# Patient Record
Sex: FEMALE | Race: WHITE | NOT HISPANIC OR LATINO | Employment: FULL TIME | URBAN - METROPOLITAN AREA
[De-identification: names, ages, dates, MRNs, and addresses within clinical notes are randomized per-mention and may not be internally consistent; named-entity substitution may affect disease eponyms.]

---

## 2024-02-02 ENCOUNTER — APPOINTMENT (OUTPATIENT)
Dept: CARDIOLOGY | Facility: HOSPITAL | Age: 39
DRG: 394 | End: 2024-02-02
Payer: COMMERCIAL

## 2024-02-02 ENCOUNTER — HOSPITAL ENCOUNTER (INPATIENT)
Facility: HOSPITAL | Age: 39
LOS: 2 days | Discharge: HOME | DRG: 394 | End: 2024-02-04
Attending: EMERGENCY MEDICINE | Admitting: INTERNAL MEDICINE
Payer: COMMERCIAL

## 2024-02-02 ENCOUNTER — APPOINTMENT (OUTPATIENT)
Dept: RADIOLOGY | Facility: HOSPITAL | Age: 39
DRG: 394 | End: 2024-02-02
Payer: COMMERCIAL

## 2024-02-02 DIAGNOSIS — R11.2 NAUSEA AND VOMITING, UNSPECIFIED VOMITING TYPE: ICD-10-CM

## 2024-02-02 DIAGNOSIS — E83.42 HYPOMAGNESEMIA: Primary | ICD-10-CM

## 2024-02-02 DIAGNOSIS — K91.850 POUCHITIS (MULTI): ICD-10-CM

## 2024-02-02 DIAGNOSIS — E87.6 HYPOKALEMIA: ICD-10-CM

## 2024-02-02 LAB
ALBUMIN SERPL BCP-MCNC: 4.3 G/DL (ref 3.4–5)
ALP SERPL-CCNC: 74 U/L (ref 33–110)
ALT SERPL W P-5'-P-CCNC: 29 U/L (ref 7–45)
AMPHETAMINES UR QL SCN: NORMAL
ANION GAP SERPL CALC-SCNC: 19 MMOL/L (ref 10–20)
AST SERPL W P-5'-P-CCNC: 34 U/L (ref 9–39)
BARBITURATES UR QL SCN: NORMAL
BASOPHILS # BLD AUTO: 0.01 X10*3/UL (ref 0–0.1)
BASOPHILS NFR BLD AUTO: 0.1 %
BENZODIAZ UR QL SCN: NORMAL
BILIRUB DIRECT SERPL-MCNC: 0.7 MG/DL (ref 0–0.3)
BILIRUB SERPL-MCNC: 3.5 MG/DL (ref 0–1.2)
BUN SERPL-MCNC: 11 MG/DL (ref 6–23)
BZE UR QL SCN: NORMAL
C DIF TOX TCDA+TCDB STL QL NAA+PROBE: NOT DETECTED
CALCIUM SERPL-MCNC: 9 MG/DL (ref 8.6–10.3)
CANNABINOIDS UR QL SCN: NORMAL
CHLORIDE SERPL-SCNC: 86 MMOL/L (ref 98–107)
CO2 SERPL-SCNC: 30 MMOL/L (ref 21–32)
CREAT SERPL-MCNC: 0.48 MG/DL (ref 0.5–1.05)
CRP SERPL-MCNC: 2.44 MG/DL
EGFRCR SERPLBLD CKD-EPI 2021: >90 ML/MIN/1.73M*2
EOSINOPHIL # BLD AUTO: 0.01 X10*3/UL (ref 0–0.7)
EOSINOPHIL NFR BLD AUTO: 0.1 %
ERYTHROCYTE [DISTWIDTH] IN BLOOD BY AUTOMATED COUNT: 14.3 % (ref 11.5–14.5)
FENTANYL+NORFENTANYL UR QL SCN: NORMAL
GLUCOSE SERPL-MCNC: 155 MG/DL (ref 74–99)
HCT VFR BLD AUTO: 40.8 % (ref 36–46)
HGB BLD-MCNC: 14.8 G/DL (ref 12–16)
IMM GRANULOCYTES # BLD AUTO: 0.04 X10*3/UL (ref 0–0.7)
IMM GRANULOCYTES NFR BLD AUTO: 0.4 % (ref 0–0.9)
INR PPP: 1 (ref 0.9–1.1)
INR PPP: 1.1 (ref 0.9–1.1)
LACTATE SERPL-SCNC: 1.7 MMOL/L (ref 0.4–2)
LIPASE SERPL-CCNC: 69 U/L (ref 9–82)
LYMPHOCYTES # BLD AUTO: 0.45 X10*3/UL (ref 1.2–4.8)
LYMPHOCYTES NFR BLD AUTO: 4.6 %
MAGNESIUM SERPL-MCNC: 1.36 MG/DL (ref 1.6–2.4)
MCH RBC QN AUTO: 39.7 PG (ref 26–34)
MCHC RBC AUTO-ENTMCNC: 36.3 G/DL (ref 32–36)
MCV RBC AUTO: 109 FL (ref 80–100)
MONOCYTES # BLD AUTO: 0.41 X10*3/UL (ref 0.1–1)
MONOCYTES NFR BLD AUTO: 4.2 %
NEUTROPHILS # BLD AUTO: 8.95 X10*3/UL (ref 1.2–7.7)
NEUTROPHILS NFR BLD AUTO: 90.6 %
NRBC BLD-RTO: 0.2 /100 WBCS (ref 0–0)
OPIATES UR QL SCN: NORMAL
OXYCODONE+OXYMORPHONE UR QL SCN: NORMAL
PCP UR QL SCN: NORMAL
PLATELET # BLD AUTO: 97 X10*3/UL (ref 150–450)
POTASSIUM SERPL-SCNC: 2.5 MMOL/L (ref 3.5–5.3)
PROT SERPL-MCNC: 7.9 G/DL (ref 6.4–8.2)
PROTHROMBIN TIME: 11.6 SECONDS (ref 9.8–12.8)
PROTHROMBIN TIME: 12.4 SECONDS (ref 9.8–12.8)
RBC # BLD AUTO: 3.73 X10*6/UL (ref 4–5.2)
SODIUM SERPL-SCNC: 132 MMOL/L (ref 136–145)
WBC # BLD AUTO: 9.9 X10*3/UL (ref 4.4–11.3)

## 2024-02-02 PROCEDURE — 1210000001 HC SEMI-PRIVATE ROOM DAILY

## 2024-02-02 PROCEDURE — 85610 PROTHROMBIN TIME: CPT | Performed by: EMERGENCY MEDICINE

## 2024-02-02 PROCEDURE — 80307 DRUG TEST PRSMV CHEM ANLYZR: CPT | Performed by: INTERNAL MEDICINE

## 2024-02-02 PROCEDURE — 87506 IADNA-DNA/RNA PROBE TQ 6-11: CPT | Mod: PORLAB | Performed by: INTERNAL MEDICINE

## 2024-02-02 PROCEDURE — 82248 BILIRUBIN DIRECT: CPT | Performed by: INTERNAL MEDICINE

## 2024-02-02 PROCEDURE — 2500000004 HC RX 250 GENERAL PHARMACY W/ HCPCS (ALT 636 FOR OP/ED): Performed by: INTERNAL MEDICINE

## 2024-02-02 PROCEDURE — 85025 COMPLETE CBC W/AUTO DIFF WBC: CPT | Performed by: EMERGENCY MEDICINE

## 2024-02-02 PROCEDURE — 2500000004 HC RX 250 GENERAL PHARMACY W/ HCPCS (ALT 636 FOR OP/ED): Performed by: EMERGENCY MEDICINE

## 2024-02-02 PROCEDURE — 74177 CT ABD & PELVIS W/CONTRAST: CPT

## 2024-02-02 PROCEDURE — 36415 COLL VENOUS BLD VENIPUNCTURE: CPT | Performed by: INTERNAL MEDICINE

## 2024-02-02 PROCEDURE — 2550000001 HC RX 255 CONTRASTS: Performed by: INTERNAL MEDICINE

## 2024-02-02 PROCEDURE — 83993 ASSAY FOR CALPROTECTIN FECAL: CPT | Performed by: INTERNAL MEDICINE

## 2024-02-02 PROCEDURE — 96365 THER/PROPH/DIAG IV INF INIT: CPT

## 2024-02-02 PROCEDURE — 99222 1ST HOSP IP/OBS MODERATE 55: CPT | Performed by: INTERNAL MEDICINE

## 2024-02-02 PROCEDURE — 99285 EMERGENCY DEPT VISIT HI MDM: CPT | Mod: 25 | Performed by: EMERGENCY MEDICINE

## 2024-02-02 PROCEDURE — 99223 1ST HOSP IP/OBS HIGH 75: CPT | Performed by: INTERNAL MEDICINE

## 2024-02-02 PROCEDURE — 93005 ELECTROCARDIOGRAM TRACING: CPT

## 2024-02-02 PROCEDURE — 83605 ASSAY OF LACTIC ACID: CPT | Performed by: EMERGENCY MEDICINE

## 2024-02-02 PROCEDURE — 83735 ASSAY OF MAGNESIUM: CPT | Performed by: EMERGENCY MEDICINE

## 2024-02-02 PROCEDURE — 96361 HYDRATE IV INFUSION ADD-ON: CPT

## 2024-02-02 PROCEDURE — 96366 THER/PROPH/DIAG IV INF ADDON: CPT

## 2024-02-02 PROCEDURE — 80053 COMPREHEN METABOLIC PANEL: CPT | Performed by: EMERGENCY MEDICINE

## 2024-02-02 PROCEDURE — 85610 PROTHROMBIN TIME: CPT | Performed by: INTERNAL MEDICINE

## 2024-02-02 PROCEDURE — 96376 TX/PRO/DX INJ SAME DRUG ADON: CPT

## 2024-02-02 PROCEDURE — 36415 COLL VENOUS BLD VENIPUNCTURE: CPT | Performed by: EMERGENCY MEDICINE

## 2024-02-02 PROCEDURE — 74177 CT ABD & PELVIS W/CONTRAST: CPT | Performed by: RADIOLOGY

## 2024-02-02 PROCEDURE — 83690 ASSAY OF LIPASE: CPT | Performed by: EMERGENCY MEDICINE

## 2024-02-02 PROCEDURE — 87493 C DIFF AMPLIFIED PROBE: CPT | Performed by: INTERNAL MEDICINE

## 2024-02-02 PROCEDURE — 86140 C-REACTIVE PROTEIN: CPT | Performed by: INTERNAL MEDICINE

## 2024-02-02 PROCEDURE — 2500000001 HC RX 250 WO HCPCS SELF ADMINISTERED DRUGS (ALT 637 FOR MEDICARE OP): Performed by: INTERNAL MEDICINE

## 2024-02-02 PROCEDURE — 96375 TX/PRO/DX INJ NEW DRUG ADDON: CPT

## 2024-02-02 RX ORDER — LORAZEPAM 2 MG/ML
1 INJECTION INTRAMUSCULAR EVERY 2 HOUR PRN
Status: DISCONTINUED | OUTPATIENT
Start: 2024-02-02 | End: 2024-02-04 | Stop reason: HOSPADM

## 2024-02-02 RX ORDER — PROCHLORPERAZINE EDISYLATE 5 MG/ML
10 INJECTION INTRAMUSCULAR; INTRAVENOUS EVERY 6 HOURS PRN
Status: DISCONTINUED | OUTPATIENT
Start: 2024-02-02 | End: 2024-02-04 | Stop reason: HOSPADM

## 2024-02-02 RX ORDER — ATORVASTATIN CALCIUM 40 MG/1
40 TABLET, FILM COATED ORAL DAILY
COMMUNITY

## 2024-02-02 RX ORDER — AMLODIPINE BESYLATE 5 MG/1
1 TABLET ORAL DAILY
COMMUNITY

## 2024-02-02 RX ORDER — ACETAMINOPHEN 325 MG/1
650 TABLET ORAL EVERY 4 HOURS PRN
Status: DISCONTINUED | OUTPATIENT
Start: 2024-02-02 | End: 2024-02-04 | Stop reason: HOSPADM

## 2024-02-02 RX ORDER — PREDNISONE 10 MG/1
TABLET ORAL
COMMUNITY
End: 2024-02-04 | Stop reason: HOSPADM

## 2024-02-02 RX ORDER — POTASSIUM CHLORIDE 14.9 MG/ML
20 INJECTION INTRAVENOUS
Status: COMPLETED | OUTPATIENT
Start: 2024-02-02 | End: 2024-02-02

## 2024-02-02 RX ORDER — POTASSIUM CHLORIDE 750 MG/1
40 TABLET, FILM COATED, EXTENDED RELEASE ORAL ONCE
Status: COMPLETED | OUTPATIENT
Start: 2024-02-02 | End: 2024-02-02

## 2024-02-02 RX ORDER — DEXTROSE MONOHYDRATE, SODIUM CHLORIDE, AND POTASSIUM CHLORIDE 50; 1.49; 9 G/1000ML; G/1000ML; G/1000ML
100 INJECTION, SOLUTION INTRAVENOUS CONTINUOUS
Status: DISPENSED | OUTPATIENT
Start: 2024-02-02 | End: 2024-02-04

## 2024-02-02 RX ORDER — LORAZEPAM 2 MG/ML
2 INJECTION INTRAMUSCULAR EVERY 2 HOUR PRN
Status: DISCONTINUED | OUTPATIENT
Start: 2024-02-02 | End: 2024-02-04 | Stop reason: HOSPADM

## 2024-02-02 RX ORDER — FOLIC ACID 1 MG/1
1 TABLET ORAL DAILY
Status: DISCONTINUED | OUTPATIENT
Start: 2024-02-02 | End: 2024-02-04 | Stop reason: HOSPADM

## 2024-02-02 RX ORDER — ONDANSETRON HYDROCHLORIDE 2 MG/ML
4 INJECTION, SOLUTION INTRAVENOUS ONCE
Status: COMPLETED | OUTPATIENT
Start: 2024-02-02 | End: 2024-02-02

## 2024-02-02 RX ORDER — ENOXAPARIN SODIUM 100 MG/ML
40 INJECTION SUBCUTANEOUS EVERY 24 HOURS
Status: DISCONTINUED | OUTPATIENT
Start: 2024-02-02 | End: 2024-02-02 | Stop reason: SDUPTHER

## 2024-02-02 RX ORDER — LANOLIN ALCOHOL/MO/W.PET/CERES
100 CREAM (GRAM) TOPICAL DAILY
Status: DISCONTINUED | OUTPATIENT
Start: 2024-02-02 | End: 2024-02-04 | Stop reason: HOSPADM

## 2024-02-02 RX ORDER — MAGNESIUM SULFATE HEPTAHYDRATE 40 MG/ML
2 INJECTION, SOLUTION INTRAVENOUS ONCE
Status: COMPLETED | OUTPATIENT
Start: 2024-02-02 | End: 2024-02-02

## 2024-02-02 RX ORDER — LORAZEPAM 2 MG/ML
0.5 INJECTION INTRAMUSCULAR EVERY 2 HOUR PRN
Status: DISCONTINUED | OUTPATIENT
Start: 2024-02-02 | End: 2024-02-04 | Stop reason: HOSPADM

## 2024-02-02 RX ORDER — AMLODIPINE BESYLATE 5 MG/1
5 TABLET ORAL DAILY
Status: DISCONTINUED | OUTPATIENT
Start: 2024-02-02 | End: 2024-02-04 | Stop reason: HOSPADM

## 2024-02-02 RX ORDER — ONDANSETRON HYDROCHLORIDE 2 MG/ML
4 INJECTION, SOLUTION INTRAVENOUS EVERY 6 HOURS PRN
Status: DISCONTINUED | OUTPATIENT
Start: 2024-02-02 | End: 2024-02-04 | Stop reason: HOSPADM

## 2024-02-02 RX ORDER — MULTIVIT-MIN/IRON FUM/FOLIC AC 7.5 MG-4
1 TABLET ORAL DAILY
Status: DISCONTINUED | OUTPATIENT
Start: 2024-02-02 | End: 2024-02-04 | Stop reason: HOSPADM

## 2024-02-02 RX ORDER — ATORVASTATIN CALCIUM 40 MG/1
40 TABLET, FILM COATED ORAL DAILY
Status: DISCONTINUED | OUTPATIENT
Start: 2024-02-02 | End: 2024-02-04 | Stop reason: HOSPADM

## 2024-02-02 RX ORDER — ENOXAPARIN SODIUM 100 MG/ML
40 INJECTION SUBCUTANEOUS EVERY 24 HOURS
Status: DISCONTINUED | OUTPATIENT
Start: 2024-02-02 | End: 2024-02-04 | Stop reason: HOSPADM

## 2024-02-02 RX ORDER — PROCHLORPERAZINE 25 MG/1
25 SUPPOSITORY RECTAL EVERY 12 HOURS PRN
Status: DISCONTINUED | OUTPATIENT
Start: 2024-02-02 | End: 2024-02-04 | Stop reason: HOSPADM

## 2024-02-02 RX ADMIN — IOHEXOL 75 ML: 350 INJECTION, SOLUTION INTRAVENOUS at 21:54

## 2024-02-02 RX ADMIN — MAGNESIUM SULFATE HEPTAHYDRATE 2 G: 40 INJECTION, SOLUTION INTRAVENOUS at 16:56

## 2024-02-02 RX ADMIN — ONDANSETRON 4 MG: 2 INJECTION INTRAMUSCULAR; INTRAVENOUS at 09:36

## 2024-02-02 RX ADMIN — MAGNESIUM SULFATE HEPTAHYDRATE 2 G: 40 INJECTION, SOLUTION INTRAVENOUS at 11:12

## 2024-02-02 RX ADMIN — THIAMINE HCL TAB 100 MG 100 MG: 100 TAB at 21:29

## 2024-02-02 RX ADMIN — POTASSIUM CHLORIDE 40 MEQ: 750 TABLET, FILM COATED, EXTENDED RELEASE ORAL at 12:53

## 2024-02-02 RX ADMIN — ATORVASTATIN CALCIUM 40 MG: 40 TABLET, FILM COATED ORAL at 21:28

## 2024-02-02 RX ADMIN — SODIUM CHLORIDE 1000 ML: 9 INJECTION, SOLUTION INTRAVENOUS at 09:36

## 2024-02-02 RX ADMIN — POTASSIUM CHLORIDE 20 MEQ: 14.9 INJECTION, SOLUTION INTRAVENOUS at 11:13

## 2024-02-02 RX ADMIN — POTASSIUM CHLORIDE, DEXTROSE MONOHYDRATE AND SODIUM CHLORIDE 100 ML/HR: 150; 5; 900 INJECTION, SOLUTION INTRAVENOUS at 18:57

## 2024-02-02 RX ADMIN — POTASSIUM CHLORIDE 20 MEQ: 14.9 INJECTION, SOLUTION INTRAVENOUS at 12:56

## 2024-02-02 RX ADMIN — METHYLPREDNISOLONE SODIUM SUCCINATE 125 MG: 125 INJECTION, POWDER, FOR SOLUTION INTRAMUSCULAR; INTRAVENOUS at 12:53

## 2024-02-02 RX ADMIN — ACETAMINOPHEN 650 MG: 325 TABLET ORAL at 21:28

## 2024-02-02 RX ADMIN — ENOXAPARIN SODIUM 40 MG: 40 INJECTION SUBCUTANEOUS at 21:28

## 2024-02-02 SDOH — SOCIAL STABILITY: SOCIAL INSECURITY: WERE YOU ABLE TO COMPLETE ALL THE BEHAVIORAL HEALTH SCREENINGS?: YES

## 2024-02-02 SDOH — SOCIAL STABILITY: SOCIAL NETWORK: ARE YOU MARRIED, WIDOWED, DIVORCED, SEPARATED, NEVER MARRIED, OR LIVING WITH A PARTNER?: PATIENT DECLINED

## 2024-02-02 SDOH — HEALTH STABILITY: MENTAL HEALTH: HOW OFTEN DO YOU HAVE 6 OR MORE DRINKS ON ONE OCCASION?: LESS THAN MONTHLY

## 2024-02-02 SDOH — HEALTH STABILITY: MENTAL HEALTH: HOW MANY STANDARD DRINKS CONTAINING ALCOHOL DO YOU HAVE ON A TYPICAL DAY?: 1 OR 2

## 2024-02-02 SDOH — SOCIAL STABILITY: SOCIAL INSECURITY
WITHIN THE LAST YEAR, HAVE YOU BEEN HUMILIATED OR EMOTIONALLY ABUSED IN OTHER WAYS BY YOUR PARTNER OR EX-PARTNER?: PATIENT DECLINED

## 2024-02-02 SDOH — HEALTH STABILITY: MENTAL HEALTH: HOW OFTEN DO YOU HAVE A DRINK CONTAINING ALCOHOL?: 4 OR MORE TIMES A WEEK

## 2024-02-02 SDOH — SOCIAL STABILITY: SOCIAL INSECURITY: HAVE YOU HAD THOUGHTS OF HARMING ANYONE ELSE?: NO

## 2024-02-02 SDOH — SOCIAL STABILITY: SOCIAL NETWORK: HOW OFTEN DO YOU ATTENT MEETINGS OF THE CLUB OR ORGANIZATION YOU BELONG TO?: PATIENT DECLINED

## 2024-02-02 SDOH — SOCIAL STABILITY: SOCIAL INSECURITY: WITHIN THE LAST YEAR, HAVE YOU BEEN AFRAID OF YOUR PARTNER OR EX-PARTNER?: PATIENT DECLINED

## 2024-02-02 SDOH — SOCIAL STABILITY: SOCIAL INSECURITY
WITHIN THE LAST YEAR, HAVE YOU BEEN KICKED, HIT, SLAPPED, OR OTHERWISE PHYSICALLY HURT BY YOUR PARTNER OR EX-PARTNER?: PATIENT DECLINED

## 2024-02-02 SDOH — HEALTH STABILITY: PHYSICAL HEALTH: ON AVERAGE, HOW MANY MINUTES DO YOU ENGAGE IN EXERCISE AT THIS LEVEL?: PATIENT DECLINED

## 2024-02-02 SDOH — HEALTH STABILITY: MENTAL HEALTH
STRESS IS WHEN SOMEONE FEELS TENSE, NERVOUS, ANXIOUS, OR CAN'T SLEEP AT NIGHT BECAUSE THEIR MIND IS TROUBLED. HOW STRESSED ARE YOU?: PATIENT DECLINED

## 2024-02-02 SDOH — ECONOMIC STABILITY: INCOME INSECURITY
IN THE PAST 12 MONTHS, HAS THE ELECTRIC, GAS, OIL, OR WATER COMPANY THREATENED TO SHUT OFF SERVICE IN YOUR HOME?: PATIENT DECLINED

## 2024-02-02 SDOH — SOCIAL STABILITY: SOCIAL NETWORK: HOW OFTEN DO YOU ATTEND CHURCH OR RELIGIOUS SERVICES?: PATIENT DECLINED

## 2024-02-02 SDOH — ECONOMIC STABILITY: FOOD INSECURITY: WITHIN THE PAST 12 MONTHS, THE FOOD YOU BOUGHT JUST DIDN'T LAST AND YOU DIDN'T HAVE MONEY TO GET MORE.: PATIENT DECLINED

## 2024-02-02 SDOH — HEALTH STABILITY: PHYSICAL HEALTH
ON AVERAGE, HOW MANY DAYS PER WEEK DO YOU ENGAGE IN MODERATE TO STRENUOUS EXERCISE (LIKE A BRISK WALK)?: PATIENT DECLINED

## 2024-02-02 SDOH — ECONOMIC STABILITY: FOOD INSECURITY: WITHIN THE PAST 12 MONTHS, YOU WORRIED THAT YOUR FOOD WOULD RUN OUT BEFORE YOU GOT MONEY TO BUY MORE.: PATIENT DECLINED

## 2024-02-02 SDOH — SOCIAL STABILITY: SOCIAL NETWORK
DO YOU BELONG TO ANY CLUBS OR ORGANIZATIONS SUCH AS CHURCH GROUPS UNIONS, FRATERNAL OR ATHLETIC GROUPS, OR SCHOOL GROUPS?: PATIENT DECLINED

## 2024-02-02 SDOH — SOCIAL STABILITY: SOCIAL NETWORK: IN A TYPICAL WEEK, HOW MANY TIMES DO YOU TALK ON THE PHONE WITH FAMILY, FRIENDS, OR NEIGHBORS?: PATIENT DECLINED

## 2024-02-02 SDOH — SOCIAL STABILITY: SOCIAL NETWORK: HOW OFTEN DO YOU GET TOGETHER WITH FRIENDS OR RELATIVES?: PATIENT DECLINED

## 2024-02-02 SDOH — SOCIAL STABILITY: SOCIAL INSECURITY
WITHIN THE LAST YEAR, HAVE TO BEEN RAPED OR FORCED TO HAVE ANY KIND OF SEXUAL ACTIVITY BY YOUR PARTNER OR EX-PARTNER?: PATIENT DECLINED

## 2024-02-02 ASSESSMENT — COLUMBIA-SUICIDE SEVERITY RATING SCALE - C-SSRS
1. IN THE PAST MONTH, HAVE YOU WISHED YOU WERE DEAD OR WISHED YOU COULD GO TO SLEEP AND NOT WAKE UP?: NO
2. HAVE YOU ACTUALLY HAD ANY THOUGHTS OF KILLING YOURSELF?: NO
6. HAVE YOU EVER DONE ANYTHING, STARTED TO DO ANYTHING, OR PREPARED TO DO ANYTHING TO END YOUR LIFE?: NO

## 2024-02-02 ASSESSMENT — ACTIVITIES OF DAILY LIVING (ADL)
ADEQUATE_TO_COMPLETE_ADL: YES
FEEDING YOURSELF: INDEPENDENT
BATHING: INDEPENDENT
GROOMING: INDEPENDENT
TOILETING: INDEPENDENT
HEARING - LEFT EAR: FUNCTIONAL
DRESSING YOURSELF: INDEPENDENT
LACK_OF_TRANSPORTATION: NO
PATIENT'S MEMORY ADEQUATE TO SAFELY COMPLETE DAILY ACTIVITIES?: YES
LACK_OF_TRANSPORTATION: NO
JUDGMENT_ADEQUATE_SAFELY_COMPLETE_DAILY_ACTIVITIES: YES
WALKS IN HOME: INDEPENDENT
HEARING - RIGHT EAR: FUNCTIONAL

## 2024-02-02 ASSESSMENT — LIFESTYLE VARIABLES
ANXIETY: NO ANXIETY, AT EASE
VISUAL DISTURBANCES: NOT PRESENT
TREMOR: NO TREMOR
PRESCIPTION_ABUSE_PAST_12_MONTHS: NO
NAUSEA AND VOMITING: NO NAUSEA AND NO VOMITING
EVER FELT BAD OR GUILTY ABOUT YOUR DRINKING: NO
PAROXYSMAL SWEATS: NO SWEAT VISIBLE
HEADACHE, FULLNESS IN HEAD: NOT PRESENT
AUDITORY DISTURBANCES: NOT PRESENT
HOW OFTEN DURING THE LAST YEAR HAVE YOU FAILED TO DO WHAT WAS NORMALLY EXPECTED FROM YOU BECAUSE OF DRINKING: NEVER
HOW OFTEN DURING THE LAST YEAR HAVE YOU NEEDED AN ALCOHOLIC DRINK FIRST THING IN THE MORNING TO GET YOURSELF GOING AFTER A NIGHT OF HEAVY DRINKING: NEVER
ORIENTATION AND CLOUDING OF SENSORIUM: ORIENTED AND CAN DO SERIAL ADDITIONS
ANXIETY: NO ANXIETY, AT EASE
VISUAL DISTURBANCES: NOT PRESENT
HOW OFTEN DURING THE LAST YEAR HAVE YOU FOUND THAT YOU WERE NOT ABLE TO STOP DRINKING ONCE YOU HAD STARTED: NEVER
AUDIT-C TOTAL SCORE: 4
AUDIT TOTAL SCORE: 4
ORIENTATION AND CLOUDING OF SENSORIUM: ORIENTED AND CAN DO SERIAL ADDITIONS
AGITATION: NORMAL ACTIVITY
ORIENTATION AND CLOUDING OF SENSORIUM: ORIENTED AND CAN DO SERIAL ADDITIONS
HAS A RELATIVE, FRIEND, DOCTOR, OR ANOTHER HEALTH PROFESSIONAL EXPRESSED CONCERN ABOUT YOUR DRINKING OR SUGGESTED YOU CUT DOWN: NO
TOTAL SCORE: 1
VISUAL DISTURBANCES: NOT PRESENT
AUDITORY DISTURBANCES: NOT PRESENT
AGITATION: NORMAL ACTIVITY
AUDIT TOTAL SCORE: 0
TREMOR: NOT VISIBLE, BUT CAN BE FELT FINGERTIP TO FINGERTIP
TOTAL SCORE: 0
PAROXYSMAL SWEATS: NO SWEAT VISIBLE
ANXIETY: NO ANXIETY, AT EASE
HEADACHE, FULLNESS IN HEAD: NOT PRESENT
SUBSTANCE_ABUSE_PAST_12_MONTHS: NO
HAVE YOU EVER FELT YOU SHOULD CUT DOWN ON YOUR DRINKING: NO
AGITATION: NORMAL ACTIVITY
HEADACHE, FULLNESS IN HEAD: NOT PRESENT
HOW OFTEN DO YOU HAVE 6 OR MORE DRINKS ON ONE OCCASION: NEVER
VISUAL DISTURBANCES: NOT PRESENT
HOW MANY STANDARD DRINKS CONTAINING ALCOHOL DO YOU HAVE ON A TYPICAL DAY: 1 OR 2
VISUAL DISTURBANCES: NOT PRESENT
TREMOR: NOT VISIBLE, BUT CAN BE FELT FINGERTIP TO FINGERTIP
HEADACHE, FULLNESS IN HEAD: NOT PRESENT
HAVE PEOPLE ANNOYED YOU BY CRITICIZING YOUR DRINKING: NO
NAUSEA AND VOMITING: NO NAUSEA AND NO VOMITING
TREMOR: NO TREMOR
TREMOR: NO TREMOR
AUDIT-C TOTAL SCORE: 5
HOW OFTEN DURING THE LAST YEAR HAVE YOU HAD A FEELING OF GUILT OR REMORSE AFTER DRINKING: NEVER
PAROXYSMAL SWEATS: NO SWEAT VISIBLE
TOTAL SCORE: 0
NAUSEA AND VOMITING: NO NAUSEA AND NO VOMITING
ORIENTATION AND CLOUDING OF SENSORIUM: ORIENTED AND CAN DO SERIAL ADDITIONS
AGITATION: NORMAL ACTIVITY
PAROXYSMAL SWEATS: NO SWEAT VISIBLE
TOTAL SCORE: 1
SKIP TO QUESTIONS 9-10: 1
ANXIETY: NO ANXIETY, AT EASE
AUDITORY DISTURBANCES: NOT PRESENT
HEADACHE, FULLNESS IN HEAD: NOT PRESENT
ORIENTATION AND CLOUDING OF SENSORIUM: ORIENTED AND CAN DO SERIAL ADDITIONS
HOW OFTEN DURING THE LAST YEAR HAVE YOU BEEN UNABLE TO REMEMBER WHAT HAPPENED THE NIGHT BEFORE BECAUSE YOU HAD BEEN DRINKING: NEVER
AGITATION: NORMAL ACTIVITY
AUDITORY DISTURBANCES: NOT PRESENT
AUDIT-C TOTAL SCORE: 4
NAUSEA AND VOMITING: NO NAUSEA AND NO VOMITING
HAVE YOU OR SOMEONE ELSE BEEN INJURED AS A RESULT OF YOUR DRINKING: NO
HOW OFTEN DO YOU HAVE A DRINK CONTAINING ALCOHOL: 4 OR MORE TIMES A WEEK
SKIP TO QUESTIONS 9-10: 0
TOTAL SCORE: 0
PAROXYSMAL SWEATS: NO SWEAT VISIBLE
AUDITORY DISTURBANCES: NOT PRESENT
EVER HAD A DRINK FIRST THING IN THE MORNING TO STEADY YOUR NERVES TO GET RID OF A HANGOVER: NO
NAUSEA AND VOMITING: NO NAUSEA AND NO VOMITING
ANXIETY: NO ANXIETY, AT EASE

## 2024-02-02 ASSESSMENT — COGNITIVE AND FUNCTIONAL STATUS - GENERAL
MOBILITY SCORE: 23
DAILY ACTIVITIY SCORE: 24
CLIMB 3 TO 5 STEPS WITH RAILING: A LITTLE
MOBILITY SCORE: 24
DAILY ACTIVITIY SCORE: 24
MOBILITY SCORE: 24
DAILY ACTIVITIY SCORE: 24
PATIENT BASELINE BEDBOUND: NO
PATIENT BASELINE BEDBOUND: NO

## 2024-02-02 ASSESSMENT — ENCOUNTER SYMPTOMS
ACTIVITY CHANGE: 1
NAUSEA: 1
CHEST TIGHTNESS: 0
DYSURIA: 0
DIARRHEA: 1
SEIZURES: 0
ABDOMINAL PAIN: 0
PALPITATIONS: 0
COLOR CHANGE: 0
COUGH: 0
CHILLS: 1
VOMITING: 1
DIZZINESS: 1
DIFFICULTY URINATING: 0

## 2024-02-02 ASSESSMENT — PAIN - FUNCTIONAL ASSESSMENT
PAIN_FUNCTIONAL_ASSESSMENT: 0-10
PAIN_FUNCTIONAL_ASSESSMENT: 0-10

## 2024-02-02 ASSESSMENT — PAIN SCALES - GENERAL
PAINLEVEL_OUTOF10: 3
PAINLEVEL_OUTOF10: 6
PAINLEVEL_OUTOF10: 8

## 2024-02-02 ASSESSMENT — PATIENT HEALTH QUESTIONNAIRE - PHQ9
SUM OF ALL RESPONSES TO PHQ9 QUESTIONS 1 & 2: 0
1. LITTLE INTEREST OR PLEASURE IN DOING THINGS: NOT AT ALL
2. FEELING DOWN, DEPRESSED OR HOPELESS: NOT AT ALL

## 2024-02-02 ASSESSMENT — PAIN DESCRIPTION - LOCATION
LOCATION: LEG
LOCATION: RECTUM

## 2024-02-02 ASSESSMENT — PAIN DESCRIPTION - ORIENTATION: ORIENTATION: RIGHT;LEFT

## 2024-02-02 ASSESSMENT — PAIN DESCRIPTION - DESCRIPTORS: DESCRIPTORS: ACHING;DULL

## 2024-02-02 NOTE — ED PROVIDER NOTES
HPI   Chief Complaint   Patient presents with    Nausea    Syncope     Multiple complaint, N/V/D, bloody stool, syncope x 1 week        Presents to the emergency department secondary to nausea vomiting dizziness and syncope.  Symptoms have been going on for the last few weeks.  She is currently being treated for pouchitis.  She has a history of a total colectomy secondary to ulcerative colitis.  She has episodes of nausea vomiting and inflammation of her pouch that is residual.  She has been on steroids for this.  She has not seen improvement with the steroids.  Since starting the steroids she started noticing blood in her stools.  The last few days she has not been able to hold the steroids down secondary to nausea and vomiting.  She actually has noticed decreased blood in her stools since not being able to take the steroids.  Today she had a bowel movement without blood in it at all.  She states that her stools are usually loose to watery.  She sees a GI physician and a primary care physician that are not [facility] affiliated.                          Bart Coma Scale Score: 15                  Patient History   Past Medical History:   Diagnosis Date    Other specified health status     No pertinent past medical history     Past Surgical History:   Procedure Laterality Date    OTHER SURGICAL HISTORY  10/27/2020    Colectomy    OTHER SURGICAL HISTORY  10/27/2020    Colostomy    OTHER SURGICAL HISTORY  10/27/2020     section     No family history on file.  Social History     Tobacco Use    Smoking status: Not on file    Smokeless tobacco: Not on file   Substance Use Topics    Alcohol use: Not on file    Drug use: Not on file       Physical Exam   ED Triage Vitals [24 0853]   Temperature Heart Rate Respirations BP   36.5 °C (97.7 °F) (!) 124 (!) 22 (!) 133/92      Pulse Ox Temp Source Heart Rate Source Patient Position   94 % Temporal -- --      BP Location FiO2 (%)     -- --       Physical  Exam  Vitals and nursing note reviewed.   Constitutional:       Appearance: Normal appearance.   HENT:      Head: Normocephalic and atraumatic.      Nose: Nose normal.      Mouth/Throat:      Mouth: Mucous membranes are moist.   Eyes:      Extraocular Movements: Extraocular movements intact.      Pupils: Pupils are equal, round, and reactive to light.   Cardiovascular:      Rate and Rhythm: Regular rhythm. Tachycardia present.      Pulses: Normal pulses.      Heart sounds: Normal heart sounds.   Pulmonary:      Effort: Pulmonary effort is normal.      Breath sounds: Normal breath sounds.   Abdominal:      General: Abdomen is flat. Bowel sounds are normal.      Palpations: Abdomen is soft.      Tenderness: There is no abdominal tenderness. There is no guarding or rebound.   Musculoskeletal:         General: Normal range of motion.      Cervical back: Normal range of motion.   Skin:     General: Skin is warm and dry.      Capillary Refill: Capillary refill takes less than 2 seconds.   Neurological:      General: No focal deficit present.      Mental Status: She is alert and oriented to person, place, and time.   Psychiatric:         Mood and Affect: Mood normal.         Behavior: Behavior normal.       Labs Reviewed   CBC WITH AUTO DIFFERENTIAL - Abnormal       Result Value    WBC 9.9      nRBC 0.2 (*)     RBC 3.73 (*)     Hemoglobin 14.8      Hematocrit 40.8       (*)     MCH 39.7 (*)     MCHC 36.3 (*)     RDW 14.3      Platelets 97 (*)     Neutrophils % 90.6      Immature Granulocytes %, Automated 0.4      Lymphocytes % 4.6      Monocytes % 4.2      Eosinophils % 0.1      Basophils % 0.1      Neutrophils Absolute 8.95 (*)     Immature Granulocytes Absolute, Automated 0.04      Lymphocytes Absolute 0.45 (*)     Monocytes Absolute 0.41      Eosinophils Absolute 0.01      Basophils Absolute 0.01     COMPREHENSIVE METABOLIC PANEL - Abnormal    Glucose 155 (*)     Sodium 132 (*)     Potassium 2.5 (*)     Chloride  86 (*)     Bicarbonate 30      Anion Gap 19      Urea Nitrogen 11      Creatinine 0.48 (*)     eGFR >90      Calcium 9.0      Albumin 4.3      Alkaline Phosphatase 74      Total Protein 7.9      AST 34      Bilirubin, Total 3.5 (*)     ALT 29     MAGNESIUM - Abnormal    Magnesium 1.36 (*)    LIPASE - Normal    Lipase 69      Narrative:     Venipuncture immediately after or during the administration of Metamizole may lead to falsely low results. Testing should be performed immediately prior to Metamizole dosing.   LACTATE - Normal    Lactate 1.7      Narrative:     Venipuncture immediately after or during the administration of Metamizole may lead to falsely low results. Testing should be performed immediately  prior to Metamizole dosing.   PROTIME-INR - Normal    Protime 11.6      INR 1.0       Pain Management Panel           No data to display              No orders to display     ED Course & MDM   Diagnoses as of 02/02/24 1238   Hypomagnesemia   Hypokalemia   Nausea and vomiting, unspecified vomiting type       Medical Decision Making  Patient presents secondary to nausea and bloody stools.  Patient also had a syncopal episode 3 days ago.  Patient is evaluated in the emergency department with EKG and laboratory workup.  She is given 1 L of normal saline IV fluid bolus.  She is given 4 mg of IV Zofran for nausea.  Laboratory workup is performed to evaluate for acute electrolyte abnormality or kidney injury.  EKG was performed.  This shows sinus rhythm rate of 99.  No acute ST elevation.  There are nonspecific ST changes present.  CT interval is 105 and QTc is 493.  Patient's laboratory workup does show evidence of hypomagnesemia and hypokalemia.  Hemoglobin is stable.  White blood cell count is within normal limits.  Heart rate improved after IV hydration.  Patient's EKG has a QTc of 493 which is likely related to her electrolyte abnormalities.  Because of this she will be admitted for hydration and continued  electrolyte replacement.  Patient was discussed with the hospitalist physician for admission.        Procedure  Procedures     Cassandra Wells MD  02/02/24 8897

## 2024-02-02 NOTE — H&P
History Of Present Illness  Raquel Ryan is a 38 y.o. female with past medical history of hypertension, hyperlipidemia, ulcerative colitis status post colectomy, anxiety and alcohol dependence presents to the emergency room with recurrent nausea, vomiting and diarrhea.  Diarrhea started about 1 week ago and patient was started on p.o. steroid taper location in [place].  Initially stool was watery but nonbloody but then became bloody after she started on steroids.  3 to 4 days ago she started having recurrent nausea vomiting and was unable to keep down the medications so she stopped she also noticed that bleeding p.o. stopped at the same time.  She had an episode of syncope which lasted a few seconds according to the  who was by bedside when this happened there was no seizure activity or head injury.  She was standing and suddenly slumped.  She admitted to having chills but no fever.  She denies any abdominal pain she denies any urinary symptoms.  She denies cough or shortness of breath.  With persistent vomiting and unable to keep food down she decided to present to the emergency room for further.    On admission in the emergency room she was tachycardic and tachypneic with a heart rate of 154/min respiratory rate of 22.  Her blood pressure was 133/92 mmHg temperature 36.5 °C and was saturating 94% on room air.  Initial BMP showed a sodium of 132 with a potassium of 2.5 chloride of 86 and magnesium of 1.36.  BUN/creatinine was only 11/0.48.  CBC was only significant for an MCV of 109 and platelets of 97.  She admitted to  daily alcohol intake. She has therefore been admitted for further evaluation and management.    Past Medical History  As in H&P above    Surgical History  She has a past surgical history that includes Other surgical history (10/27/2020); Other surgical history (10/27/2020); and Other surgical history (10/27/2020).     Social History  She has no history on file for tobacco use, alcohol use,  and drug use.    Family History  No family history on file.     Allergies  Patient has no allergy information on record.    Review of Systems   Constitutional:  Positive for activity change and chills.   Respiratory:  Negative for cough and chest tightness.    Cardiovascular:  Negative for chest pain and palpitations.   Gastrointestinal:  Positive for diarrhea, nausea and vomiting. Negative for abdominal pain.   Genitourinary:  Negative for difficulty urinating and dysuria.   Skin:  Negative for color change.   Neurological:  Positive for dizziness and syncope. Negative for seizures.        Physical Exam  Constitutional:       Appearance: Normal appearance. She is normal weight.   HENT:      Head: Normocephalic and atraumatic.      Mouth/Throat:      Mouth: Mucous membranes are dry.   Cardiovascular:      Rate and Rhythm: Normal rate and regular rhythm.   Pulmonary:      Effort: Pulmonary effort is normal.      Breath sounds: Normal breath sounds.   Abdominal:      General: Abdomen is flat.      Palpations: Abdomen is soft.   Musculoskeletal:      Cervical back: Neck supple. No rigidity.   Skin:     General: Skin is warm and dry.   Neurological:      General: No focal deficit present.      Mental Status: She is alert and oriented to person, place, and time.   Psychiatric:         Mood and Affect: Mood normal.         Behavior: Behavior normal.          Last Recorded Vitals  /90 (Patient Position: Lying)   Pulse 97   Temp 36.5 °C (97.7 °F) (Temporal)   Resp 14   Wt 77.1 kg (170 lb)   SpO2 99%     Relevant Results             Assessment/Plan   Principal Problem:    Hypomagnesemia      #Acute exacerbation of UC/proctitis  Admitted on the regular nursing floor  Started on IV Solu-Medrol  Stool for C. difficile pathogen PCR  Will hold off antibiotics for now and seek GI recommendation  GI consult    #Intractable nausea vomiting  Likely related to above  IV antiemetics   Continue IV fluids    #Alcohol  dependence/withdrawal  Started on alcohol withdrawal protocol with [facility]    #Hypomagnesemia  Magnesium supplementation given  Repeat magnesium in a.m.    #Hypokalemia  Potassium supplementation given  Trend BMP daily    #Hypertension  Will hold off on antihypertensives for now  Can resume in a.m. if blood pressure is improved    #Hyperlipidemia  Resume atorvastatin    #DVT prophylaxis  Subcutaneous Lovenox  Hold Lovenox if thrombocytopenia worsens in AM.      Spent 65 minutes in the initial admission of this patient.    Cameron Schaefer MD

## 2024-02-02 NOTE — PROGRESS NOTES
Raquel Ryan is a 38 y.o. female admitted for No Principal Problem: There is no principal problem currently on the Problem List. Please update the Problem List and refresh.. Pharmacy reviewed the patient's [id number] medications and allergies for accuracy.    The list below reflects the PTA list prior to pharmacy medication history. A summary a changes to the PTA medication list has been listed below. Please review each medication in order reconciliation for additional clarification and justification.    Medications added:   PREDNISONE 10 MG  AMLODIPINE 5 MG  ATORVASTATIN 40 MG    Medications modified:    Medications to be removed:    Medications of concern:      None       Yael Ohara CPhT

## 2024-02-02 NOTE — CONSULTS
"Inpatient consult to gastroenterology  Consult performed by: Eber Lehman MD  Consult ordered by: Cameron Schaefer MD        Reason For Consult  \"?Exacerbation on Ulcerative Colitis s/p Colectomy\"        [facility] Gastroenterology Consultation Note    ASSESSMENT and PLAN:       Raquel Ryan is a 38 y.o. female with a significant past medical history of HTN, HLD, anxiety, alcohol use, and UC s/p colectomy who presented with nausea/vomiting and rectal bleeding. GI was consulted for \"?Exacerbation on Ulcerative Colitis s/p Colectomy\".       Pouchitis  Reported history of UC s/p colectomy as well as a recent diagnosis of pouchitis. Details of her previous diagnosis and recent evaluation is unclear. Typical initial treatment of pouchitis is a course of antibiotics (Cipro/Flagyl). Possible that her endoscopy showed more proximal inflammatory changes (Crohn's-like disease of the pouch) which would be treated with steroids, but without records it is hard to know. She had some improvement with steroids initially, but now with worsening symptoms will need to evaluate for other causes including infections. With significant nausea/vomiting would also recommend cross sectional imaging. She was already given a large dose (125 mg of Methylprednisolone) by the ER today. Would recommend waiting on additional doses until further work up is available.  - labs ordered including inflammatory markers and labs to rule out infectious causes of symptoms  - CT with contrast ordered  - consider continuation of IV steroids (60 mg methylprednisolone per day) pending results of initial labs/imaging  - follow up with her gastroenterologist (Dr. Wood) after discharge      Hyperbilirubinemia  Total bilirubin elevated on admission with no other LFT changes. She has a reported history of alcohol use. No known liver disease, but platelets are also low. Underlying chronic liver disease possible. The last LFTs available in the EMR are " "from 2021 and bilirubin was normal at that time. Will check direct bilirubin to evaluate for Gilbert's/hemolysis. Will also need imaging (CT ordered) to evaluate for any evidence of an obstructive process leading to bilirubin elevation. Pending those results additional work up may be needed.  - follow LFTs daily while admitted  - labs ordered  - CT ordered as above        There is no inpatient GI coverage tonight (after 1600 today) or this weekend. Dr. Lay will provide coverage again starting on 2/5/2024, but if there is a need for further GI evaluation or procedure prior to that then the patient should be transferred.    Alternatively, could consider transfer to the [place] system where she could be under the care of her gastroenterologist.        Eber Lehman MD        Gastroenterology    [facility]    Clinical   [facility]        HISTORY OF PRESENT ILLNESS:     History Of Present Illness:    Raquel Ryan is a 38 y.o. female with a significant past medical history of HTN, HLD, anxiety, alcohol use, and UC s/p colectomy who presented with nausea/vomiting and rectal bleeding. GI was consulted for \"?Exacerbation on Ulcerative Colitis s/p Colectomy\".      She says that she has a history of UC and previously underwent colectomy. She follows with Dr. Wood at [facility] ([facility]) who is affiliated with [facility]. She says that she has not had significant issues with pouchitis and that in the past she has only had \"minor flares\" that she was \"able to stop with dietary changes\". About a month ago she says that she went to her gastroenterologist for her routine pouchoscopy and at that time she was told that there was inflammation and she was started on steroids. At that time she was not having significant symptoms, but then after her procedure she says that she started having abdominal " pain and diarrhea. This improved with steroids and she was tapering down on steroids (most recently taking 20 mg per day of prednisone), but over the last 3 days she has had worse symptoms again with increased pain, bloody diarrhea, and now nausea/vomiting. She has not been able to take Prednisone for those three days. She denies every being on antibiotics for pouchitis and she also says that she is scheduled to see her gastroenterologist on Monday (2/5/2024). She believes that they are supposed to discuss starting a different maintenance regimen, but she is not sure what that is.      Review of systems:     Patient denies any fevers/chills, heartburn/GERD, dysphagia, odynophagia, diarrhea, constipation, hematemesis, hematochezia, melena, or weight loss.    I performed a complete 10 point review of systems and it is negative except as noted in HPI or above. All other systems have been reviewed and are negative.        PAST HISTORIES:       Past Medical History:  She has a past medical history of Hypertension and Other specified health status.    Past Surgical History:  She has a past surgical history that includes Other surgical history (10/27/2020); Other surgical history (10/27/2020); Other surgical history (10/27/2020); and Colon surgery.      Social History:  She reports that she has never smoked. She has never been exposed to tobacco smoke. She has never used smokeless tobacco. She reports current alcohol use of about 2.0 standard drinks of alcohol per week. No history on file for drug use.    Family History:  No known GI disease, specifically denies pancreatitis, Crohn's, colon cancer, gastroesophageal cancer, or ulcerative colitis.    No family history on file.     Allergies:  Latex and Morphine      OBJECTIVE:       Last Recorded Vitals:  Vitals:    02/02/24 0853 02/02/24 1018 02/02/24 1120 02/02/24 1254   BP: (!) 133/92 141/88  133/90   Patient Position:    Lying   Pulse: (!) 124 89 91 97   Resp: (!) 22 11  "15 14   Temp: 36.5 °C (97.7 °F)      TempSrc: Temporal      SpO2: 94% 97% 95% 99%   Weight: 77.1 kg (170 lb)      Height: 1.702 m (5' 7\")        /90 (Patient Position: Lying)   Pulse 97   Temp 36.5 °C (97.7 °F) (Temporal)   Resp 14   Ht 1.702 m (5' 7\")   Wt 77.1 kg (170 lb)   SpO2 99%   BMI 26.63 kg/m²      Physical Exam:    Physical Exam  Vitals reviewed.   Constitutional:       General: She is not in acute distress.     Appearance: She is not ill-appearing.   HENT:      Head: Normocephalic and atraumatic.   Eyes:      General: No scleral icterus.  Cardiovascular:      Rate and Rhythm: Normal rate and regular rhythm.      Pulses: Normal pulses.      Heart sounds: Normal heart sounds. No murmur heard.  Pulmonary:      Effort: Pulmonary effort is normal. No respiratory distress.      Breath sounds: Normal breath sounds. No wheezing.   Abdominal:      General: Bowel sounds are normal.      Palpations: Abdomen is soft.      Tenderness: There is no abdominal tenderness. There is no rebound.      Comments: Midline surgical scar   Musculoskeletal:         General: No swelling or deformity.   Skin:     General: Skin is warm and dry.      Coloration: Skin is not jaundiced.      Findings: No rash.   Neurological:      General: No focal deficit present.      Mental Status: She is alert and oriented to person, place, and time.   Psychiatric:         Mood and Affect: Mood is anxious.         Behavior: Behavior normal.         Thought Content: Thought content normal.         Judgment: Judgment normal.           Inpatient Medications:  [Held by provider] amLODIPine, 5 mg, oral, Daily  atorvastatin, 40 mg, oral, Daily  enoxaparin, 40 mg, subcutaneous, q24h  influenza, 0.5 mL, intramuscular, During hospitalization  folic acid, 1 mg, oral, Daily  magnesium sulfate, 2 g, intravenous, Once  multivitamin with minerals, 1 tablet, oral, Daily  thiamine, 100 mg, oral, Daily      PRN medications: LORazepam **OR** LORazepam " "**OR** LORazepam, ondansetron, prochlorperazine **OR** prochlorperazine    Outpatient Medications:  Prior to Admission medications    Medication Sig Start Date End Date Taking? Authorizing Provider   amLODIPine (Norvasc) 5 mg tablet Take 1 tablet (5 mg) by mouth once daily.    Historical Provider, MD   atorvastatin (Lipitor) 40 mg tablet Take 1 tablet (40 mg) by mouth once daily.    Historical Provider, MD   predniSONE (Deltasone) 10 mg tablet Start JAN 23-   40 mg x 1 week  30 mg x 1 week  20 mg x 1 week  10 mg x 1 week    Historical Provider, MD       LABS AND IMAGING:     Labs:  Recent labs reviewed in the EMR.    Lab Results   Component Value Date    WBC 9.9 02/02/2024    HGB 14.8 02/02/2024     (H) 02/02/2024    PLT 97 (L) 02/02/2024       Lab Results   Component Value Date     (L) 02/02/2024    K 2.5 (LL) 02/02/2024    CL 86 (L) 02/02/2024    BUN 11 02/02/2024    CREATININE 0.48 (L) 02/02/2024       Lab Results   Component Value Date    BILITOT 3.5 (H) 02/02/2024    ALKPHOS 74 02/02/2024    AST 34 02/02/2024    ALT 29 02/02/2024    LIPASE 69 02/02/2024       No results found for: \"CRP\", \"CALPS\"      Imaging:  ECG 12 lead    Result Date: 2/2/2024  Sinus rhythm Minimal ST depression, diffuse leads Borderline prolonged QT interval         "

## 2024-02-03 LAB
ALBUMIN SERPL BCP-MCNC: 3.5 G/DL (ref 3.4–5)
ALP SERPL-CCNC: 63 U/L (ref 33–110)
ALT SERPL W P-5'-P-CCNC: 21 U/L (ref 7–45)
ANION GAP SERPL CALC-SCNC: 13 MMOL/L (ref 10–20)
AST SERPL W P-5'-P-CCNC: 22 U/L (ref 9–39)
ATRIAL RATE: 100 BPM
BILIRUB SERPL-MCNC: 1.7 MG/DL (ref 0–1.2)
BUN SERPL-MCNC: 3 MG/DL (ref 6–23)
C COLI+JEJ+UPSA DNA STL QL NAA+PROBE: NOT DETECTED
CALCIUM SERPL-MCNC: 8.1 MG/DL (ref 8.6–10.3)
CHLORIDE SERPL-SCNC: 99 MMOL/L (ref 98–107)
CO2 SERPL-SCNC: 28 MMOL/L (ref 21–32)
CREAT SERPL-MCNC: 0.26 MG/DL (ref 0.5–1.05)
EC STX1 GENE STL QL NAA+PROBE: NOT DETECTED
EC STX2 GENE STL QL NAA+PROBE: NOT DETECTED
EGFRCR SERPLBLD CKD-EPI 2021: >90 ML/MIN/1.73M*2
ERYTHROCYTE [DISTWIDTH] IN BLOOD BY AUTOMATED COUNT: 14.2 % (ref 11.5–14.5)
GLUCOSE SERPL-MCNC: 147 MG/DL (ref 74–99)
HCT VFR BLD AUTO: 34.3 % (ref 36–46)
HGB BLD-MCNC: 12.1 G/DL (ref 12–16)
MAGNESIUM SERPL-MCNC: 2.23 MG/DL (ref 1.6–2.4)
MCH RBC QN AUTO: 40.1 PG (ref 26–34)
MCHC RBC AUTO-ENTMCNC: 35.3 G/DL (ref 32–36)
MCV RBC AUTO: 114 FL (ref 80–100)
NOROVIRUS GI + GII RNA STL NAA+PROBE: NOT DETECTED
NRBC BLD-RTO: 0 /100 WBCS (ref 0–0)
P AXIS: 61 DEGREES
PLATELET # BLD AUTO: 84 X10*3/UL (ref 150–450)
POTASSIUM SERPL-SCNC: 3.1 MMOL/L (ref 3.5–5.3)
PR INTERVAL: 105 MS
PROT SERPL-MCNC: 6.6 G/DL (ref 6.4–8.2)
Q ONSET: 251 MS
QRS COUNT: 16 BEATS
QRS DURATION: 96 MS
QT INTERVAL: 384 MS
QTC CALCULATION(BAZETT): 493 MS
QTC FREDERICIA: 453 MS
R AXIS: 72 DEGREES
RBC # BLD AUTO: 3.02 X10*6/UL (ref 4–5.2)
RV RNA STL NAA+PROBE: NOT DETECTED
SALMONELLA DNA STL QL NAA+PROBE: NOT DETECTED
SHIGELLA DNA SPEC QL NAA+PROBE: NOT DETECTED
SODIUM SERPL-SCNC: 137 MMOL/L (ref 136–145)
T AXIS: -6 DEGREES
T OFFSET: 443 MS
V CHOLERAE DNA STL QL NAA+PROBE: NOT DETECTED
VENTRICULAR RATE: 99 BPM
WBC # BLD AUTO: 7.7 X10*3/UL (ref 4.4–11.3)
Y ENTEROCOL DNA STL QL NAA+PROBE: NOT DETECTED

## 2024-02-03 PROCEDURE — 85060 BLOOD SMEAR INTERPRETATION: CPT | Performed by: INTERNAL MEDICINE

## 2024-02-03 PROCEDURE — 99233 SBSQ HOSP IP/OBS HIGH 50: CPT | Performed by: INTERNAL MEDICINE

## 2024-02-03 PROCEDURE — 83735 ASSAY OF MAGNESIUM: CPT | Performed by: INTERNAL MEDICINE

## 2024-02-03 PROCEDURE — 2500000004 HC RX 250 GENERAL PHARMACY W/ HCPCS (ALT 636 FOR OP/ED): Performed by: INTERNAL MEDICINE

## 2024-02-03 PROCEDURE — 1210000001 HC SEMI-PRIVATE ROOM DAILY

## 2024-02-03 PROCEDURE — 2500000001 HC RX 250 WO HCPCS SELF ADMINISTERED DRUGS (ALT 637 FOR MEDICARE OP): Performed by: INTERNAL MEDICINE

## 2024-02-03 PROCEDURE — 80053 COMPREHEN METABOLIC PANEL: CPT | Performed by: INTERNAL MEDICINE

## 2024-02-03 PROCEDURE — 85027 COMPLETE CBC AUTOMATED: CPT | Performed by: INTERNAL MEDICINE

## 2024-02-03 PROCEDURE — 36415 COLL VENOUS BLD VENIPUNCTURE: CPT | Performed by: INTERNAL MEDICINE

## 2024-02-03 RX ORDER — POTASSIUM CHLORIDE 20 MEQ/1
40 TABLET, EXTENDED RELEASE ORAL 3 TIMES DAILY
Status: COMPLETED | OUTPATIENT
Start: 2024-02-03 | End: 2024-02-04

## 2024-02-03 RX ORDER — CIPROFLOXACIN 500 MG/1
500 TABLET ORAL EVERY 12 HOURS SCHEDULED
Status: DISCONTINUED | OUTPATIENT
Start: 2024-02-03 | End: 2024-02-04 | Stop reason: HOSPADM

## 2024-02-03 RX ORDER — METRONIDAZOLE 500 MG/1
500 TABLET ORAL EVERY 8 HOURS SCHEDULED
Status: DISCONTINUED | OUTPATIENT
Start: 2024-02-03 | End: 2024-02-04 | Stop reason: HOSPADM

## 2024-02-03 RX ADMIN — POTASSIUM CHLORIDE, DEXTROSE MONOHYDRATE AND SODIUM CHLORIDE 100 ML/HR: 150; 5; 900 INJECTION, SOLUTION INTRAVENOUS at 10:20

## 2024-02-03 RX ADMIN — THIAMINE HCL TAB 100 MG 100 MG: 100 TAB at 10:17

## 2024-02-03 RX ADMIN — POTASSIUM CHLORIDE 40 MEQ: 1500 TABLET, EXTENDED RELEASE ORAL at 16:07

## 2024-02-03 RX ADMIN — Medication 1 TABLET: at 10:18

## 2024-02-03 RX ADMIN — ATORVASTATIN CALCIUM 40 MG: 40 TABLET, FILM COATED ORAL at 21:00

## 2024-02-03 RX ADMIN — ACETAMINOPHEN 650 MG: 325 TABLET ORAL at 02:53

## 2024-02-03 RX ADMIN — FOLIC ACID 1 MG: 1 TABLET ORAL at 10:18

## 2024-02-03 RX ADMIN — POTASSIUM CHLORIDE, DEXTROSE MONOHYDRATE AND SODIUM CHLORIDE 100 ML/HR: 150; 5; 900 INJECTION, SOLUTION INTRAVENOUS at 06:27

## 2024-02-03 RX ADMIN — METHYLPREDNISOLONE SODIUM SUCCINATE 60 MG: 125 INJECTION, POWDER, FOR SOLUTION INTRAMUSCULAR; INTRAVENOUS at 16:07

## 2024-02-03 RX ADMIN — POTASSIUM CHLORIDE, DEXTROSE MONOHYDRATE AND SODIUM CHLORIDE 100 ML/HR: 150; 5; 900 INJECTION, SOLUTION INTRAVENOUS at 19:30

## 2024-02-03 RX ADMIN — METRONIDAZOLE 500 MG: 500 TABLET ORAL at 23:25

## 2024-02-03 RX ADMIN — CIPROFLOXACIN HYDROCHLORIDE 500 MG: 500 TABLET, FILM COATED ORAL at 20:59

## 2024-02-03 RX ADMIN — METRONIDAZOLE 500 MG: 500 TABLET ORAL at 16:08

## 2024-02-03 RX ADMIN — POTASSIUM CHLORIDE 40 MEQ: 1500 TABLET, EXTENDED RELEASE ORAL at 20:59

## 2024-02-03 ASSESSMENT — COGNITIVE AND FUNCTIONAL STATUS - GENERAL
DAILY ACTIVITIY SCORE: 24
CLIMB 3 TO 5 STEPS WITH RAILING: A LITTLE
CLIMB 3 TO 5 STEPS WITH RAILING: A LITTLE
MOBILITY SCORE: 23
DAILY ACTIVITIY SCORE: 24
MOBILITY SCORE: 23

## 2024-02-03 ASSESSMENT — LIFESTYLE VARIABLES
ORIENTATION AND CLOUDING OF SENSORIUM: ORIENTED AND CAN DO SERIAL ADDITIONS
HEADACHE, FULLNESS IN HEAD: NOT PRESENT
ORIENTATION AND CLOUDING OF SENSORIUM: ORIENTED AND CAN DO SERIAL ADDITIONS
AGITATION: NORMAL ACTIVITY
NAUSEA AND VOMITING: NO NAUSEA AND NO VOMITING
VISUAL DISTURBANCES: NOT PRESENT
AUDITORY DISTURBANCES: NOT PRESENT
PAROXYSMAL SWEATS: NO SWEAT VISIBLE
TREMOR: NOT VISIBLE, BUT CAN BE FELT FINGERTIP TO FINGERTIP
AGITATION: NORMAL ACTIVITY
PAROXYSMAL SWEATS: NO SWEAT VISIBLE
TOTAL SCORE: 1
ANXIETY: NO ANXIETY, AT EASE
AUDITORY DISTURBANCES: NOT PRESENT
PAROXYSMAL SWEATS: NO SWEAT VISIBLE
TOTAL SCORE: 1
ANXIETY: NO ANXIETY, AT EASE
ORIENTATION AND CLOUDING OF SENSORIUM: ORIENTED AND CAN DO SERIAL ADDITIONS
VISUAL DISTURBANCES: NOT PRESENT
AGITATION: NORMAL ACTIVITY
PAROXYSMAL SWEATS: NO SWEAT VISIBLE
TREMOR: NOT VISIBLE, BUT CAN BE FELT FINGERTIP TO FINGERTIP
NAUSEA AND VOMITING: NO NAUSEA AND NO VOMITING
NAUSEA AND VOMITING: NO NAUSEA AND NO VOMITING
AUDITORY DISTURBANCES: NOT PRESENT
AGITATION: NORMAL ACTIVITY
PAROXYSMAL SWEATS: NO SWEAT VISIBLE
NAUSEA AND VOMITING: NO NAUSEA AND NO VOMITING
ANXIETY: NO ANXIETY, AT EASE
VISUAL DISTURBANCES: NOT PRESENT
NAUSEA AND VOMITING: NO NAUSEA AND NO VOMITING
AGITATION: NORMAL ACTIVITY
ANXIETY: NO ANXIETY, AT EASE
TREMOR: NOT VISIBLE, BUT CAN BE FELT FINGERTIP TO FINGERTIP
TREMOR: NOT VISIBLE, BUT CAN BE FELT FINGERTIP TO FINGERTIP
TOTAL SCORE: 1
AUDITORY DISTURBANCES: NOT PRESENT
AGITATION: NORMAL ACTIVITY
TREMOR: NOT VISIBLE, BUT CAN BE FELT FINGERTIP TO FINGERTIP
VISUAL DISTURBANCES: NOT PRESENT
HEADACHE, FULLNESS IN HEAD: NOT PRESENT
AUDITORY DISTURBANCES: NOT PRESENT
ORIENTATION AND CLOUDING OF SENSORIUM: ORIENTED AND CAN DO SERIAL ADDITIONS
PAROXYSMAL SWEATS: NO SWEAT VISIBLE
VISUAL DISTURBANCES: NOT PRESENT
TREMOR: NOT VISIBLE, BUT CAN BE FELT FINGERTIP TO FINGERTIP
HEADACHE, FULLNESS IN HEAD: NOT PRESENT
TOTAL SCORE: 1
ANXIETY: NO ANXIETY, AT EASE
VISUAL DISTURBANCES: NOT PRESENT
AUDITORY DISTURBANCES: NOT PRESENT
HEADACHE, FULLNESS IN HEAD: NOT PRESENT
HEADACHE, FULLNESS IN HEAD: NOT PRESENT
ANXIETY: NO ANXIETY, AT EASE
HEADACHE, FULLNESS IN HEAD: NOT PRESENT
TOTAL SCORE: 1
NAUSEA AND VOMITING: NO NAUSEA AND NO VOMITING
ORIENTATION AND CLOUDING OF SENSORIUM: ORIENTED AND CAN DO SERIAL ADDITIONS

## 2024-02-03 ASSESSMENT — PAIN DESCRIPTION - DESCRIPTORS: DESCRIPTORS: ACHING

## 2024-02-03 ASSESSMENT — PAIN - FUNCTIONAL ASSESSMENT
PAIN_FUNCTIONAL_ASSESSMENT: 0-10

## 2024-02-03 ASSESSMENT — PAIN SCALES - GENERAL
PAINLEVEL_OUTOF10: 2
PAINLEVEL_OUTOF10: 0 - NO PAIN

## 2024-02-03 ASSESSMENT — ACTIVITIES OF DAILY LIVING (ADL): LACK_OF_TRANSPORTATION: NO

## 2024-02-03 NOTE — PROGRESS NOTES
02/03/24 1231   Discharge Planning   Living Arrangements Spouse/significant other;Children   Support Systems Spouse/significant other;Children   Assistance Needed Independnet   Type of Residence Private residence   Home or Post Acute Services None   Patient expects to be discharged to: Home   Does the patient need discharge transport arranged? No   Financial Resource Strain   How hard is it for you to pay for the very basics like food, housing, medical care, and heating? Not hard   Housing Stability   In the last 12 months, was there a time when you were not able to pay the mortgage or rent on time? N   In the last 12 months, was there a time when you did not have a steady place to sleep or slept in a shelter (including now)? N   Transportation Needs   In the past 12 months, has lack of transportation kept you from medical appointments or from getting medications? no   In the past 12 months, has lack of transportation kept you from meetings, work, or from getting things needed for daily living? No     PCP Amarjit Jiménez. Patient is from home with  and son. Patient is independent with ambulation, self care, driving, shopping, and meals.  Patient plans to return home with no needs upon discharge. [facility] will continue to follow for needs if they arise.

## 2024-02-03 NOTE — CARE PLAN
The patient's goals for the shift include      The clinical goals for the shift include        Problem: Pain  Goal: My pain/discomfort is manageable  Outcome: Progressing     Problem: Safety  Goal: Patient will be injury free during hospitalization  Outcome: Progressing  Goal: I will remain free of falls  Outcome: Progressing     Problem: Daily Care  Goal: Daily care needs are met  Outcome: Progressing     Problem: Psychosocial Needs  Goal: Demonstrates ability to cope with hospitalization/illness  Outcome: Progressing  Goal: Collaborate with me, my family, and caregiver to identify my specific goals  Outcome: Progressing     Problem: Discharge Barriers  Goal: My discharge needs are met  Outcome: Progressing     Problem: Pain  Goal: Takes deep breaths with improved pain control throughout the shift  Outcome: Progressing  Goal: Turns in bed with improved pain control throughout the shift  Outcome: Progressing  Goal: Walks with improved pain control throughout the shift  Outcome: Progressing  Goal: Performs ADL's with improved pain control throughout shift  Outcome: Progressing  Goal: Participates in PT with improved pain control throughout the shift  Outcome: Progressing  Goal: Free from opioid side effects throughout the shift  Outcome: Progressing  Goal: Free from acute confusion related to pain meds throughout the shift  Outcome: Progressing     Problem: Nutrition  Goal: Less than 5 days NPO/clear liquids  Outcome: Progressing  Goal: Oral intake greater than 50%  Outcome: Progressing  Goal: Oral intake greater 75%  Outcome: Progressing  Goal: Consume prescribed supplement  Outcome: Progressing  Goal: Adequate PO fluid intake  Outcome: Progressing  Goal: Nutrition support goals are met within 48 hrs  Outcome: Progressing  Goal: Nutrition support is meeting 75% of nutrient needs  Outcome: Progressing  Goal: Tube feed tolerance  Outcome: Progressing  Goal: BG  mg/dL  Outcome: Progressing  Goal: Lab values  WNL  Outcome: Progressing  Goal: Electrolytes WNL  Outcome: Progressing  Goal: Promote healing  Outcome: Progressing  Goal: Maintain stable weight  Outcome: Progressing  Goal: Reduce weight from edema/fluid  Outcome: Progressing  Goal: Gradual weight gain  Outcome: Progressing  Goal: Improve ostomy output  Outcome: Progressing     Problem: Pain - Adult  Goal: Verbalizes/displays adequate comfort level or baseline comfort level  Outcome: Progressing     Problem: Safety - Adult  Goal: Free from fall injury  Outcome: Progressing     Problem: Discharge Planning  Goal: Discharge to home or other facility with appropriate resources  Outcome: Progressing     Problem: Chronic Conditions and Co-morbidities  Goal: Patient's chronic conditions and co-morbidity symptoms are monitored and maintained or improved  Outcome: Progressing

## 2024-02-03 NOTE — PROGRESS NOTES
Raquel Ryan is a 38 y.o. female on day 1 of admission presenting with Hypomagnesemia.    Subjective   Raquel Ryan is a 38 y.o. female with past medical history of hypertension, hyperlipidemia, ulcerative colitis status post colectomy, anxiety and alcohol dependence presents to the emergency room with recurrent nausea, vomiting and diarrhea.  Diarrhea started about 1 week ago and patient was started on p.o. steroid taper location in [place].  Initially stool was watery but nonbloody but then became bloody after she started on steroids.  3 to 4 days ago she started having recurrent nausea vomiting and was unable to keep down the medications so she stopped she also noticed that bleeding p.o. stopped at the same time.  She had an episode of syncope which lasted a few seconds according to the  who was by bedside when this happened there was no seizure activity or head injury.  She was standing and suddenly slumped.  She admitted to having chills but no fever.  She denies any abdominal pain she denies any urinary symptoms.  She denies cough or shortness of breath.  With persistent vomiting and unable to keep food down she decided to present to the emergency room for further.     On admission in the emergency room she was tachycardic and tachypneic with a heart rate of 154/min respiratory rate of 22.  Her blood pressure was 133/92 mmHg temperature 36.5 °C and was saturating 94% on room air.  Initial BMP showed a sodium of 132 with a potassium of 2.5 chloride of 86 and magnesium of 1.36.  BUN/creatinine was only 11/0.48.  CBC was only significant for an MCV of 109 and platelets of 97.  She admitted to  daily alcohol intake. She has therefore been admitted for further evaluation and management.     Past Medical History  As in H&P above     Surgical History  She has a past surgical history that includes Other surgical history (10/27/2020); Other surgical history (10/27/2020); and Other surgical history  "(10/27/2020).     Social History  She has no history on file for tobacco use, alcohol use, and drug use.     Family History  Family History   No family history on file.        Allergies  Patient has no allergy information on record.    2/3: Clinically symptoms consistent with pouchitis.  Patient labs look normal CAT scan is pending.  Will advance diet since she is feeling better.  Give another dose of IV Solu-Medrol today and start some oral Cipro and Flagyl.  Likely will discharge tomorrow if symptoms improved and findings are not too concerning.  Also awaiting stool PCR C. difficile negative so far.  Patient notes she does have a follow-up appointment set up with her gastroenterologist on Monday.         Objective     Physical Exam    EXAM:    Constitutional:    Head and facial:    Neck: Supple present bilaterally    Lungs:    Heart: Regular heart rate    Abdomen: Abdomen mild discomfort hyperactive bowel sounds    Pelvis/: No significant flank tenderness    Extremities: No gross edema     Neurologic: No focal deficits     Dermatologic: Patient with flushing    Psychiatric/behavioral: Pleasant and appropriate excited to eat and anticipates being discharged tomorrow possibly        Last Recorded Vitals  Blood pressure 130/89, pulse 106, temperature 36.9 °C (98.4 °F), temperature source Temporal, resp. rate 16, height 1.702 m (5' 7\"), weight 70.8 kg (156 lb 1.6 oz), SpO2 97 %.  Intake/Output last 3 Shifts:  I/O last 3 completed shifts:  In: 2216.7 (31.3 mL/kg) [P.O.:1280; I.V.:100 (1.4 mL/kg); IV Piggyback:836.7]  Out: - (0 mL/kg)   Weight: 70.8 kg     Relevant Results                             Assessment/Plan     Suspected pouchitis  History of ulcerative colitis status post colectomy  Recent diagnosis of pouchitis  Elevated bilirubin  Intractable nausea and vomiting improved  Hypomagnesemia  Hypokalemia  History of hypertension DVT prophylaxis-SCDs and subcu Lovenox      IV Solu-Medrol today and tomorrow  Oral " Cipro and Flagyl started  Follow-up on CAT scan  Supplemental electrolytes  Advance diet  Anticipate discharge possibly tomorrow with follow-up with gastroenterologist on Monday already set up  Check labs in a.m.  See orders for complete plan       I spent 35 minutes in the professional and overall care of this patient.      Isaiah Burt MD

## 2024-02-03 NOTE — CARE PLAN
Patient:   CAROLE BANKS            MRN: [id number]            FIN: [id number]              Age:   65 years     Sex:  MALE     :  50   Associated Diagnoses:   None   Author:   ARGELIA GASTELUM   pt seen and examined  on trach collar   sp PEG tolerating tube feeds  sp endoscopy by ent   mri brain negative   still afebrile   on trach collar  still w/ significant secretions   plan for chemo in am   iv hydration today      Health Status   Allergies:    Allergies (1) Active Reaction  NKA None Documented    Current medications:    Medications (12) Active  Scheduled: (5)  Docusate sodium 100 mg/10 mL oral liquid repack  50 mg 5 mL, Oral, Daily  Heparin 5,000 unit/1 mL inj  5,000 unit 1 mL, Subcutaneous, Q8H  Pantoprazole 40 mg/20 mL (2 mg/mL) oral susp repack  40 mg 20 mL, G-tube, Q24H  Senna 8.8 mg/5 mL oral syrup repack  8.8 mg 5 mL, Oral, Daily  Sodium chloride PF 0.9% flush inj 10 mL  10 mL, Flush, Q12H  Continuous: (0)  PRN: (7)  Acetaminophen 650 mg/20.3 mL oral liquid UD  650 mg 20.3 mL, Oral, Q4H  Albuterol 0.083% 2.5 mg/3 mL nebulizer soln  2.5 mg 3 mL, Nebulizer, Q6H  Albuterol-ipratropium 2.5-0.5 mg/3 mL nebulizer soln  3 mL, Nebulizer, Q6H  FentaNYL 100 mcg/2 mL inj SDV  25 mcg 0.5 mL, IV Push, Q15 Minutes  Midazolam PF 2 mg/2 mL inj SDV  2 mg 2 mL, Slow IV Push, Q10 Minutes  Sodium chloride PF 0.9% flush inj 10 mL  10 mL, Flush, As Directed PRN  Sodium chloride PF 0.9% flush inj 10 mL  20 mL, Flush, As Directed PRN      Objective   VS/Measurements     Vitals between:   2019 15:31:59   TO   2019 15:31:59                   LAST RESULT MINIMUM MAXIMUM  Temperature 37.4 37.1 37.5  Heart Rate 98 88 100  Respiratory Rate 16 16 18  NISBP           99 99 121  NIDBP           62 62 73  SpO2                    98 98 99  FiO2                    0.28 0.28 0.28    General:  Alert and oriented, intuabed awake .    Eye:  Pupils are equal, round and reactive to light, Normal  The patient's goals for the shift include      The clinical goals for the shift include Pt will tolerate clear liquid diet overnight        conjunctiva, Vision unchanged.   HENT:  Normocephalic, Normal hearing, Oral mucosa is moist, No pharyngeal erythema, No sinus tenderness.   Neck:  large neck mass bialterally very hard to palpation, r side w/ erytheam  Respiratory:  Lungs are clear to auscultation, Respirations are non-labored, Breath sounds are equal.   Cardiovascular:  Normal rate, Regular rhythm, No murmur, Good pulses equal in all extremities, Normal peripheral perfusion, No edema.   Gastrointestinal:  Soft, Non-tender, Non-distended, Normal bowel sounds.   Genitourinary:  No costovertebral angle tenderness.    Musculoskeletal:  Normal strength, no swelling in joints.   Integumentary:  Warm, Dry, Pink, No pallor, No rash.    Neurologic:  Alert, Oriented, Normal motor function, No focal deficits, Cranial Nerves II-XII are grossly intact  Psychiatric:  Cooperative, Appropriate mood & affect.              Results Review   General results   Interpretation:   Labs between:  21-JUL-2019 15:31 to 22-JUL-2019 15:31  BMP:                 Na  Cl  BUN  Glu   22-JUL-2019 (H) 147  (H) 114  (H) 30  (H) 136                              K  CO2  Cr  Ca                              4.1  28  0.96  8.6                                Impression and Plan   #Acute respiratory failure  Now intubated requiring minimal sedation on oriented  On pressure support  sp trachoestomy and endoscopy   ent following   contiue on trach/vent suport   still w/ +secretions  plan for decreasing trach size over weekend  dw dr sorenson   on trach collar cuff deflated tdoay   keep on trach collar w/ suctioning  dw w/ rn  pulm following   #Hypopharyngeal poorly differentiated squamous cell carcinoma, with ariway compresomise  -considered stage IVc dx, not curable  start chemo in am per dr esqueda  PICC line ordered   will need immunotherapy   may not radiation therapy if he responds to chemo   bedside biopsy done by Dr Yañez        Findings: Metastatic non-small cell carcinoma, preliminary  diagnosis.  Additional specimen taken for ancillary studies..         Specimens Removed: Yes,    ENT on consult  Appreciate further recommendations as to whether patient is a surgical candidate.  Aside from the significant masses and lymphadenopathy in the neck patient also has thyroid enlargement  will ask radiation oncolgoy on consutl DR su/ Dr Bernard   7/16 endoscopy by ENT w/ bopsy  will need tracheostomy  squamous cancer head vs. neck vs lung ?   7/17 MRI brain today  sp trach   path pending likely lung cancer w/ mets   7/18 dw dr sorenson   mri brain  peg tube to be placed today  7/19 toelratign tube feeds  +continue bds and suctioning  trach cuff change over weekedn   7/20 MRI brain negative for mets   7/21 PICC line ordered   chemo to be started in am   needs outpt oncolgoist that is in insurance network   CT chest abd pelvis no mets  mri brain no mets  oncology following   7/22 pICC line in  start iv fluids   dw dr tran   chemo to be started tomorrow,will need 1 day chemo   #Cirrhotic liver as seen on CT  #Leukocytosis  We will monitor closely received steroids next  #anemia likely due to underlying malignancy  Had a colonoscopy 2 years ago which was normal  stable fo rnow   #Right lower lobe pneumonia  On Zosyn and azithromycin  dc zosyn  willm onitor   completed abx   #Suspected COPD  Bronchodilators as needed  Has significant clubbing on exam  #Moderate protein calorie malnutrition  We will monitor  Tube feeds for now  sp pEG   continue ftube feeds   DVT prophylaxis ultra fractionated heparin  GI prophylaxis famotidine  Full code  PCP is unknown at this time   sp trach  now on medical floors chemoto be started in am   dw rn and pt and family includnig brother at bedside    CHEMO TO BE STAERTED tomorow debby tran  if stable on 7/24 may be dc to [facility] as logn as we have an insurance in-network oncologist avaialble for patient to follow up with. DR Tran's group is not in network fo rhis insuarcne   this was discsused in detail w/ yasmin and cm

## 2024-02-04 VITALS
BODY MASS INDEX: 24.5 KG/M2 | TEMPERATURE: 98.6 F | OXYGEN SATURATION: 98 % | WEIGHT: 156.1 LBS | SYSTOLIC BLOOD PRESSURE: 143 MMHG | HEIGHT: 67 IN | HEART RATE: 95 BPM | DIASTOLIC BLOOD PRESSURE: 93 MMHG | RESPIRATION RATE: 16 BRPM

## 2024-02-04 LAB
ANION GAP SERPL CALC-SCNC: 10 MMOL/L (ref 10–20)
BASOPHILS # BLD AUTO: 0.01 X10*3/UL (ref 0–0.1)
BASOPHILS NFR BLD AUTO: 0.2 %
BUN SERPL-MCNC: 6 MG/DL (ref 6–23)
CALCIUM SERPL-MCNC: 7.6 MG/DL (ref 8.6–10.3)
CHLORIDE SERPL-SCNC: 103 MMOL/L (ref 98–107)
CO2 SERPL-SCNC: 27 MMOL/L (ref 21–32)
CREAT SERPL-MCNC: 0.32 MG/DL (ref 0.5–1.05)
EGFRCR SERPLBLD CKD-EPI 2021: >90 ML/MIN/1.73M*2
EOSINOPHIL # BLD AUTO: 0.01 X10*3/UL (ref 0–0.7)
EOSINOPHIL NFR BLD AUTO: 0.2 %
ERYTHROCYTE [DISTWIDTH] IN BLOOD BY AUTOMATED COUNT: 14.1 % (ref 11.5–14.5)
GLUCOSE SERPL-MCNC: 134 MG/DL (ref 74–99)
HCT VFR BLD AUTO: 33.9 % (ref 36–46)
HGB BLD-MCNC: 11.9 G/DL (ref 12–16)
IMM GRANULOCYTES # BLD AUTO: 0.06 X10*3/UL (ref 0–0.7)
IMM GRANULOCYTES NFR BLD AUTO: 1 % (ref 0–0.9)
LYMPHOCYTES # BLD AUTO: 0.66 X10*3/UL (ref 1.2–4.8)
LYMPHOCYTES NFR BLD AUTO: 10.5 %
MAGNESIUM SERPL-MCNC: 1.84 MG/DL (ref 1.6–2.4)
MCH RBC QN AUTO: 40.5 PG (ref 26–34)
MCHC RBC AUTO-ENTMCNC: 35.1 G/DL (ref 32–36)
MCV RBC AUTO: 115 FL (ref 80–100)
MONOCYTES # BLD AUTO: 0.37 X10*3/UL (ref 0.1–1)
MONOCYTES NFR BLD AUTO: 5.9 %
NEUTROPHILS # BLD AUTO: 5.2 X10*3/UL (ref 1.2–7.7)
NEUTROPHILS NFR BLD AUTO: 82.2 %
NRBC BLD-RTO: 0.5 /100 WBCS (ref 0–0)
PLATELET # BLD AUTO: 88 X10*3/UL (ref 150–450)
POTASSIUM SERPL-SCNC: 3.9 MMOL/L (ref 3.5–5.3)
RBC # BLD AUTO: 2.94 X10*6/UL (ref 4–5.2)
SODIUM SERPL-SCNC: 136 MMOL/L (ref 136–145)
WBC # BLD AUTO: 6.3 X10*3/UL (ref 4.4–11.3)

## 2024-02-04 PROCEDURE — 36415 COLL VENOUS BLD VENIPUNCTURE: CPT | Performed by: INTERNAL MEDICINE

## 2024-02-04 PROCEDURE — 2500000004 HC RX 250 GENERAL PHARMACY W/ HCPCS (ALT 636 FOR OP/ED): Performed by: INTERNAL MEDICINE

## 2024-02-04 PROCEDURE — 2500000001 HC RX 250 WO HCPCS SELF ADMINISTERED DRUGS (ALT 637 FOR MEDICARE OP): Performed by: INTERNAL MEDICINE

## 2024-02-04 PROCEDURE — 85025 COMPLETE CBC W/AUTO DIFF WBC: CPT | Performed by: INTERNAL MEDICINE

## 2024-02-04 PROCEDURE — 99239 HOSP IP/OBS DSCHRG MGMT >30: CPT | Performed by: INTERNAL MEDICINE

## 2024-02-04 PROCEDURE — 80048 BASIC METABOLIC PNL TOTAL CA: CPT | Performed by: INTERNAL MEDICINE

## 2024-02-04 PROCEDURE — 83735 ASSAY OF MAGNESIUM: CPT | Performed by: INTERNAL MEDICINE

## 2024-02-04 RX ORDER — CIPROFLOXACIN 500 MG/1
500 TABLET ORAL EVERY 12 HOURS SCHEDULED
Qty: 9 TABLET | Refills: 0 | Status: SHIPPED | OUTPATIENT
Start: 2024-02-04

## 2024-02-04 RX ORDER — PROMETHAZINE HYDROCHLORIDE 25 MG/1
25 TABLET ORAL EVERY 6 HOURS PRN
Qty: 30 TABLET | Refills: 0 | Status: SHIPPED | OUTPATIENT
Start: 2024-02-04

## 2024-02-04 RX ORDER — METRONIDAZOLE 500 MG/1
500 TABLET ORAL EVERY 8 HOURS SCHEDULED
Qty: 13 TABLET | Refills: 0 | Status: SHIPPED | OUTPATIENT
Start: 2024-02-04

## 2024-02-04 RX ORDER — ACETAMINOPHEN 325 MG/1
650 TABLET ORAL EVERY 4 HOURS PRN
Qty: 30 TABLET | Refills: 0
Start: 2024-02-04

## 2024-02-04 RX ADMIN — POTASSIUM CHLORIDE, DEXTROSE MONOHYDRATE AND SODIUM CHLORIDE 100 ML/HR: 150; 5; 900 INJECTION, SOLUTION INTRAVENOUS at 06:42

## 2024-02-04 RX ADMIN — Medication 1 TABLET: at 09:58

## 2024-02-04 RX ADMIN — POTASSIUM CHLORIDE 40 MEQ: 1500 TABLET, EXTENDED RELEASE ORAL at 09:58

## 2024-02-04 RX ADMIN — METRONIDAZOLE 500 MG: 500 TABLET ORAL at 13:28

## 2024-02-04 RX ADMIN — CIPROFLOXACIN HYDROCHLORIDE 500 MG: 500 TABLET, FILM COATED ORAL at 09:58

## 2024-02-04 RX ADMIN — FOLIC ACID 1 MG: 1 TABLET ORAL at 09:58

## 2024-02-04 RX ADMIN — THIAMINE HCL TAB 100 MG 100 MG: 100 TAB at 09:58

## 2024-02-04 RX ADMIN — METRONIDAZOLE 500 MG: 500 TABLET ORAL at 06:41

## 2024-02-04 ASSESSMENT — LIFESTYLE VARIABLES
NAUSEA AND VOMITING: NO NAUSEA AND NO VOMITING
TREMOR: NO TREMOR
AGITATION: NORMAL ACTIVITY
TREMOR: NO TREMOR
HEADACHE, FULLNESS IN HEAD: NOT PRESENT
TREMOR: NO TREMOR
ORIENTATION AND CLOUDING OF SENSORIUM: ORIENTED AND CAN DO SERIAL ADDITIONS
AUDITORY DISTURBANCES: NOT PRESENT
AUDITORY DISTURBANCES: NOT PRESENT
NAUSEA AND VOMITING: NO NAUSEA AND NO VOMITING
ORIENTATION AND CLOUDING OF SENSORIUM: ORIENTED AND CAN DO SERIAL ADDITIONS
NAUSEA AND VOMITING: NO NAUSEA AND NO VOMITING
AGITATION: NORMAL ACTIVITY
AGITATION: NORMAL ACTIVITY
TOTAL SCORE: 0
VISUAL DISTURBANCES: NOT PRESENT
VISUAL DISTURBANCES: NOT PRESENT
ORIENTATION AND CLOUDING OF SENSORIUM: ORIENTED AND CAN DO SERIAL ADDITIONS
HEADACHE, FULLNESS IN HEAD: NOT PRESENT
VISUAL DISTURBANCES: NOT PRESENT
ANXIETY: NO ANXIETY, AT EASE
AUDITORY DISTURBANCES: NOT PRESENT
PAROXYSMAL SWEATS: NO SWEAT VISIBLE
PAROXYSMAL SWEATS: NO SWEAT VISIBLE
ANXIETY: NO ANXIETY, AT EASE
HEADACHE, FULLNESS IN HEAD: NOT PRESENT
PAROXYSMAL SWEATS: NO SWEAT VISIBLE
ANXIETY: NO ANXIETY, AT EASE

## 2024-02-04 ASSESSMENT — COGNITIVE AND FUNCTIONAL STATUS - GENERAL: MOBILITY SCORE: 24

## 2024-02-04 ASSESSMENT — PAIN SCALES - GENERAL: PAINLEVEL_OUTOF10: 0 - NO PAIN

## 2024-02-04 ASSESSMENT — PAIN - FUNCTIONAL ASSESSMENT: PAIN_FUNCTIONAL_ASSESSMENT: 0-10

## 2024-02-04 NOTE — DISCHARGE SUMMARY
Discharge Diagnosis    pouchitis  History of ulcerative colitis status post colectomy  Recent diagnosis of pouchitis  Elevated bilirubin  Intractable nausea and vomiting-improved  Hypomagnesemia-removed  Hypokalemia-improved    Issues Requiring Follow-Up  Follow-up with GI physician tomorrow see after visit summary for complete plan.    Discharge Meds     Your medication list        START taking these medications        Instructions Last Dose Given Next Dose Due   acetaminophen 325 mg tablet  Commonly known as: Tylenol      Take 2 tablets (650 mg) by mouth every 4 hours if needed for moderate pain (4 - 6) or fever (temp greater than 38.0 C).       ciprofloxacin 500 mg tablet  Commonly known as: Cipro      Take 1 tablet (500 mg) by mouth every 12 hours.       metroNIDAZOLE 500 mg tablet  Commonly known as: Flagyl      Take 1 tablet (500 mg) by mouth every 8 hours.       promethazine 25 mg tablet  Commonly known as: Phenergan      Take 1 tablet (25 mg) by mouth every 6 hours if needed for nausea or vomiting.              CONTINUE taking these medications        Instructions Last Dose Given Next Dose Due   amLODIPine 5 mg tablet  Commonly known as: Norvasc           atorvastatin 40 mg tablet  Commonly known as: Lipitor                  STOP taking these medications      predniSONE 10 mg tablet  Commonly known as: Deltasone                  Where to Get Your Medications        These medications were sent to [facility]/pharmacy #[id number] - [place], [place] - [address]  [address] [facility] [address]      Phone: [phone]   ciprofloxacin 500 mg tablet  metroNIDAZOLE 500 mg tablet  promethazine 25 mg tablet       Information about where to get these medications is not yet available    Ask your nurse or doctor about these medications  acetaminophen 325 mg tablet         Test Results Pending At Discharge  Pending Labs       Order Current Status    Calprotectin Stool In process    Pathologist Review-CBC Differential In  process    Pathologist Review-CBC Differential In process            Hospital Course   Raquel Ryan is a 38 y.o. female with past medical history of hypertension, hyperlipidemia, ulcerative colitis status post colectomy, anxiety and alcohol dependence presents to the emergency room with recurrent nausea, vomiting and diarrhea.  Diarrhea started about 1 week ago and patient was started on p.o. steroid taper location in [place].  Initially stool was watery but nonbloody but then became bloody after she started on steroids.  3 to 4 days ago she started having recurrent nausea vomiting and was unable to keep down the medications so she stopped she also noticed that bleeding p.o. stopped at the same time.  She had an episode of syncope which lasted a few seconds according to the  who was by bedside when this happened there was no seizure activity or head injury.  She was standing and suddenly slumped.  She admitted to having chills but no fever.  She denies any abdominal pain she denies any urinary symptoms.  She denies cough or shortness of breath.  With persistent vomiting and unable to keep food down she decided to present to the emergency room for further.     On admission in the emergency room she was tachycardic and tachypneic with a heart rate of 154/min respiratory rate of 22.  Her blood pressure was 133/92 mmHg temperature 36.5 °C and was saturating 94% on room air.  Initial BMP showed a sodium of 132 with a potassium of 2.5 chloride of 86 and magnesium of 1.36.  BUN/creatinine was only 11/0.48.  CBC was only significant for an MCV of 109 and platelets of 97.  She admitted to  daily alcohol intake. She has therefore been admitted for further evaluation and management.        Past Medical History  As in H&P above     Surgical History  She has a past surgical history that includes Other surgical history (10/27/2020); Other surgical history (10/27/2020); and Other surgical history (10/27/2020).     Social  History  She has no history on file for tobacco use, alcohol use, and drug use.     Family History  Family History   No family history on file.         Allergies  Patient has no allergy information on record.     2/3: Clinically symptoms consistent with pouchitis.  Patient labs look normal CAT scan is pending.  Will advance diet since she is feeling better.  Give another dose of IV Solu-Medrol today and start some oral Cipro and Flagyl.  Likely will discharge tomorrow if symptoms improved and findings are not too concerning.  Also awaiting stool PCR C. difficile negative so far.  Patient notes she does have a follow-up appointment set up with her gastroenterologist on Monday.     2/4: Electrolytes within normal limits patient now longer having nausea and vomiting feeling better.  Will send home on total of 5 days of Flagyl and Cipro p.o.  GI doctor to make any further recommendations or adjustments based on their assessment.  No more steroids for now we will leave that up to GI as well.  She will follow-up with her GI doctor tomorrow.  Patient feeling well and eating well.  See after visit summary for complete plan.     35 minutes spent in the care of this patient.    Pertinent Physical Exam At Time of Discharge  Physical Exam  See today's progress note more for physical exam findings.  Outpatient Follow-Up  No future appointments.      Isaiah Burt MD

## 2024-02-04 NOTE — CARE PLAN
The patient's goals for the shift include      The clinical goals for the shift include patient will remain free of pain or discomfort this shift; patient environment will remain safe.

## 2024-02-04 NOTE — CARE PLAN
The patient's goals for the shift include  adequate rest    The clinical goals for the shift include patient will remain free of pain or discomfort this shift      Problem: Pain  Goal: My pain/discomfort is manageable  Outcome: Progressing     Problem: Safety  Goal: Patient will be injury free during hospitalization  Outcome: Progressing  Goal: I will remain free of falls  Outcome: Progressing     Problem: Daily Care  Goal: Daily care needs are met  Outcome: Progressing     Problem: Psychosocial Needs  Goal: Demonstrates ability to cope with hospitalization/illness  Outcome: Progressing  Goal: Collaborate with me, my family, and caregiver to identify my specific goals  Outcome: Progressing     Problem: Discharge Barriers  Goal: My discharge needs are met  Outcome: Progressing     Problem: Pain  Goal: Takes deep breaths with improved pain control throughout the shift  Outcome: Progressing  Goal: Turns in bed with improved pain control throughout the shift  Outcome: Progressing  Goal: Walks with improved pain control throughout the shift  Outcome: Progressing  Goal: Performs ADL's with improved pain control throughout shift  Outcome: Progressing  Goal: Participates in PT with improved pain control throughout the shift  Outcome: Progressing  Goal: Free from opioid side effects throughout the shift  Outcome: Progressing  Goal: Free from acute confusion related to pain meds throughout the shift  Outcome: Progressing     Problem: Pain - Adult  Goal: Verbalizes/displays adequate comfort level or baseline comfort level  Outcome: Progressing     Problem: Safety - Adult  Goal: Free from fall injury  Outcome: Progressing     Problem: Discharge Planning  Goal: Discharge to home or other facility with appropriate resources  Outcome: Progressing

## 2024-02-04 NOTE — PROGRESS NOTES
Raquel Ryan is a 38 y.o. female on day 2 of admission presenting with Hypomagnesemia.    Subjective   Raquel Ryan is a 38 y.o. female with past medical history of hypertension, hyperlipidemia, ulcerative colitis status post colectomy, anxiety and alcohol dependence presents to the emergency room with recurrent nausea, vomiting and diarrhea.  Diarrhea started about 1 week ago and patient was started on p.o. steroid taper location in [place].  Initially stool was watery but nonbloody but then became bloody after she started on steroids.  3 to 4 days ago she started having recurrent nausea vomiting and was unable to keep down the medications so she stopped she also noticed that bleeding p.o. stopped at the same time.  She had an episode of syncope which lasted a few seconds according to the  who was by bedside when this happened there was no seizure activity or head injury.  She was standing and suddenly slumped.  She admitted to having chills but no fever.  She denies any abdominal pain she denies any urinary symptoms.  She denies cough or shortness of breath.  With persistent vomiting and unable to keep food down she decided to present to the emergency room for further.     On admission in the emergency room she was tachycardic and tachypneic with a heart rate of 154/min respiratory rate of 22.  Her blood pressure was 133/92 mmHg temperature 36.5 °C and was saturating 94% on room air.  Initial BMP showed a sodium of 132 with a potassium of 2.5 chloride of 86 and magnesium of 1.36.  BUN/creatinine was only 11/0.48.  CBC was only significant for an MCV of 109 and platelets of 97.  She admitted to  daily alcohol intake. She has therefore been admitted for further evaluation and management.       Past Medical History  As in H&P above     Surgical History  She has a past surgical history that includes Other surgical history (10/27/2020); Other surgical history (10/27/2020); and Other surgical history  "(10/27/2020).     Social History  She has no history on file for tobacco use, alcohol use, and drug use.     Family History  Family History   No family history on file.        Allergies  Patient has no allergy information on record.    2/3: Clinically symptoms consistent with pouchitis.  Patient labs look normal CAT scan is pending.  Will advance diet since she is feeling better.  Give another dose of IV Solu-Medrol today and start some oral Cipro and Flagyl.  Likely will discharge tomorrow if symptoms improved and findings are not too concerning.  Also awaiting stool PCR C. difficile negative so far.  Patient notes she does have a follow-up appointment set up with her gastroenterologist on Monday.    2/4: Electrolytes within normal limits patient now longer having nausea and vomiting feeling better.  Will send home on total of 5 days of Flagyl and Cipro p.o.  GI doctor to make any further recommendations or adjustments based on their assessment.  No more steroids for now we will leave that up to GI as well.  She will follow-up with her GI doctor tomorrow.  Patient feeling well and eating well.  See after visit summary for complete plan.    35 minutes spent in the care of this patient.         Objective     Physical Exam    EXAM:    Constitutional:    Head and facial:    Neck: Supple present bilaterally    Lungs:    Heart: Regular heart rate    Abdomen: Patient denies any abdominal symptoms notes some discomfort in pouch around baseline.    Pelvis/: No significant flank tenderness    Extremities: No gross edema     Neurologic: No focal deficits     Dermatologic: Patient with flushing    Psychiatric/behavioral: Pleasant appetite is good feeling okay for discharge.      Last Recorded Vitals  Blood pressure 126/88, pulse 94, temperature 36.6 °C (97.8 °F), temperature source Temporal, resp. rate 17, height 1.702 m (5' 7\"), weight 70.8 kg (156 lb 1.6 oz), SpO2 98 %.  Intake/Output last 3 Shifts:  I/O last 3 completed " shifts:  In: 3026.7 (42.7 mL/kg) [P.O.:2190; IV Piggyback:836.7]  Out: - (0 mL/kg)   Weight: 70.8 kg     Relevant Results                             Assessment/Plan     Suspected pouchitis  History of ulcerative colitis status post colectomy  Recent diagnosis of pouchitis  Elevated bilirubin  Intractable nausea and vomiting improved  Hypomagnesemia  Hypokalemia  History of hypertension DVT prophylaxis-SCDs and subcu Lovenox      Discharge home on oral Cipro and Flagyl see after visit summary for complete plan follow-up with GI tomorrow.         I spent 35 minutes in the professional and overall care of this patient.      Isaiah Burt MD

## 2024-02-05 ENCOUNTER — LAB (OUTPATIENT)
Dept: LAB | Facility: LAB | Age: 39
End: 2024-02-05
Payer: COMMERCIAL

## 2024-02-05 DIAGNOSIS — K91.850 POUCHITIS (MULTI): Primary | ICD-10-CM

## 2024-02-05 LAB — PATH REVIEW-CBC DIFFERENTIAL: NORMAL

## 2024-02-05 PROCEDURE — 36415 COLL VENOUS BLD VENIPUNCTURE: CPT

## 2024-02-05 PROCEDURE — 87340 HEPATITIS B SURFACE AG IA: CPT

## 2024-02-05 PROCEDURE — 86481 TB AG RESPONSE T-CELL SUSP: CPT

## 2024-02-05 PROCEDURE — 86705 HEP B CORE ANTIBODY IGM: CPT

## 2024-02-06 LAB
HBV CORE IGM SER QL: NONREACTIVE
HBV SURFACE AG SERPL QL IA: NONREACTIVE

## 2024-02-07 LAB
NIL(NEG) CONTROL SPOT COUNT: NORMAL
PANEL A SPOT COUNT: 0
PANEL B SPOT COUNT: 0
POS CONTROL SPOT COUNT: NORMAL
T-SPOT. TB INTERPRETATION: NEGATIVE

## 2024-02-08 LAB — CALPROTECTIN STL-MCNT: 1740 UG/G

## 2025-01-16 ENCOUNTER — OFFICE VISIT (OUTPATIENT)
Facility: CLINIC | Age: 40
End: 2025-01-16
Payer: MEDICAID

## 2025-01-16 ENCOUNTER — APPOINTMENT (OUTPATIENT)
Facility: CLINIC | Age: 40
End: 2025-01-16
Payer: MEDICAID

## 2025-01-16 VITALS
WEIGHT: 180 LBS | HEIGHT: 67 IN | DIASTOLIC BLOOD PRESSURE: 102 MMHG | HEART RATE: 108 BPM | SYSTOLIC BLOOD PRESSURE: 148 MMHG | BODY MASS INDEX: 28.25 KG/M2

## 2025-01-16 DIAGNOSIS — K62.89 RECTAL CUFFITIS: Primary | ICD-10-CM

## 2025-01-16 DIAGNOSIS — Z79.52 LONG TERM SYSTEMIC STEROID USER: ICD-10-CM

## 2025-01-16 DIAGNOSIS — K91.89 RECTAL CUFFITIS: Primary | ICD-10-CM

## 2025-01-16 PROCEDURE — 99213 OFFICE O/P EST LOW 20 MIN: CPT | Performed by: INTERNAL MEDICINE

## 2025-01-16 PROCEDURE — 3008F BODY MASS INDEX DOCD: CPT | Performed by: INTERNAL MEDICINE

## 2025-01-16 RX ORDER — BUDESONIDE 3 MG/1
9 CAPSULE, COATED PELLETS ORAL EVERY MORNING
Qty: 60 CAPSULE | Refills: 3 | Status: SHIPPED | OUTPATIENT
Start: 2025-01-16

## 2025-01-16 RX ORDER — DICYCLOMINE HYDROCHLORIDE 20 MG/1
TABLET ORAL
COMMUNITY

## 2025-01-16 RX ORDER — ONDANSETRON 4 MG/1
TABLET, FILM COATED ORAL
COMMUNITY
Start: 2024-11-21

## 2025-01-16 RX ORDER — PREDNISONE 10 MG/1
TABLET ORAL
COMMUNITY
Start: 2024-11-18

## 2025-01-16 NOTE — ASSESSMENT & PLAN NOTE
Ulcerative colitis status post IPAA with history of recurrent pouchitis and more recent cuffitis and a rectovaginal fistula, followed by her long-term gastroenterologist.   She is on long-term modest dose prednisone of 10 mg/day, and Rinvoq and a recent course of antibiotics.      Our discussion primarily revolved around the role for Jeannine ileostomy as her quality of life has diminished based on her postsurgical complications.  She is due to see a colorectal surgeon and I think this is the right timing and right choice to pursue.  I also offered to facilitate colorectal surgery referral and stressed the importance of evaluation by a IBD center of excellence as appropriate.      We will add budesonide 3 mg daily to determine if this topical steroid offers therapeutic benefit and/or allows tapering of her systemic corticosteroid.  I discussed the safe and appropriate use of this drug.  I would be happy to see her back on an as-needed basis.    Orders:    budesonide EC (Entocort EC) 3 mg 24 hr capsule; Take 3 capsules (9 mg) by mouth once daily in the morning.    Referral to Colorectal Surgery; Future

## 2025-01-16 NOTE — PROGRESS NOTES
[facility]    ASSESSMENT and PLAN:     Patient Active Problem List   Diagnosis    Hypomagnesemia    Rectal cuffitis          Assessment & Plan  Rectal cuffitis  Ulcerative colitis status post IPAA with history of recurrent pouchitis and more recent cuffitis and a rectovaginal fistula, followed by her long-term gastroenterologist.   She is on long-term modest dose prednisone of 10 mg/day, and Rinvoq and a recent course of antibiotics.      Our discussion primarily revolved around the role for Jeannine ileostomy as her quality of life has diminished based on her postsurgical complications.  She is due to see a colorectal surgeon and I think this is the right timing and right choice to pursue.  I also offered to facilitate colorectal surgery referral and stressed the importance of evaluation by a IBD center of excellence as appropriate.      We will add budesonide 3 mg daily to determine if this topical steroid offers therapeutic benefit and/or allows tapering of her systemic corticosteroid.  I discussed the safe and appropriate use of this drug.  I would be happy to see her back on an as-needed basis.    Orders:    budesonide EC (Entocort EC) 3 mg 24 hr capsule; Take 3 capsules (9 mg) by mouth once daily in the morning.    Referral to Colorectal Surgery; Future    Long term systemic steroid user    Orders:    XR DEXA bone density; Future      Jet Palafox MD    Gastroenterology    [facility]            Subjective   HISTORY OF PRESENT ILLNESS:     Chief Complaint  New Patient Visit (IBD, UC - looking for 2nd opinion - current GI is [facility] suggesting a permanent colostomy bag/Last scope was in November)    History Of Present Illness:    Raquel Ryan is a 39 y.o. female with a significant past medical history of colitis for many years status post ileoanal pouch anastomosis surgery who presents for consultation requested  by her primary care provider (Amarjit Jiménez MD) for a second opinion regarding conversion to Jeannine ileostomy surgery.  She has been maintained on prednisone 10 mg daily for several months as well as Rinvoq.  For bouts of pouchitis she has had ciprofloxacin.  Her last pouchoscopy in 11/2024 showed cufftis and she described urgency, bleeding, diarrhea, and pain.  She has had varying results on immunomodulators and other Biologics.  She admits to substantial social isolation from friends, hobbies and time with family based on the constancy of her abdominal discomfort and difficult bowel movements.  She has gained weight on steroids but denies other significant complications.  She has not had prior bone density testing but has had annual eye and skin exams.    Patient denies any heartburn/GERD, N/V, dysphagia, odynophagia, hematemesis, hematochezia, melena, fevers chills, or unintentional weight loss.    Endoscopy History:    As above    Review of systems:   Review of Systems    As per the HPI      PAST HISTORIES:       Past Medical History:  Patient Active Problem List   Diagnosis    Hypomagnesemia    Rectal cuffitis     She has a past medical history of Hypertension and Other specified health status.    She has no past medical history of Cancer (Multi), CHF (congestive heart failure), Diabetes mellitus (Multi), or Renal insufficiency.    Past Surgical History:  She has a past surgical history that includes Other surgical history (10/27/2020); Other surgical history (10/27/2020); Other surgical history (10/27/2020); and Colon surgery.      Social History:  She reports that she has never smoked. She has never been exposed to tobacco smoke. She has never used smokeless tobacco. She reports current alcohol use of about 2.0 standard drinks of alcohol per week. She reports current drug use. Drug: Marijuana.    Family History:  No known family history of GI disease, specifically denies any family history of  "pancreatitis, Crohn's, colon cancer, gastroesophageal cancer, or ulcerative colitis.    Family History   Problem Relation Name Age of Onset    Ulcerative colitis Mother      Diverticulitis Maternal Grandmother          Allergies:  Latex and Morphine      Objective   OBJECTIVE:       Last Recorded Vitals:  Vitals:    01/16/25 1339   BP: (!) 148/102   Pulse: 108   Weight: 81.6 kg (180 lb)   Height: 1.702 m (5' 7\")     BP (!) 148/102   Pulse 108   Ht 1.702 m (5' 7\")   Wt 81.6 kg (180 lb)   BMI 28.19 kg/m²      Physical Exam:    Physical Exam  Constitutional:       Appearance: She is obese.   HENT:      Head:      Comments: Slight cushingoid appearance     Right Ear: External ear normal.      Left Ear: External ear normal.      Nose: Nose normal.      Mouth/Throat:      Mouth: Mucous membranes are moist.      Pharynx: Oropharynx is clear. No oropharyngeal exudate.   Eyes:      General: No scleral icterus.     Extraocular Movements: Extraocular movements intact.   Cardiovascular:      Rate and Rhythm: Normal rate and regular rhythm.      Heart sounds: Normal heart sounds. No murmur heard.  Pulmonary:      Effort: Pulmonary effort is normal. No respiratory distress.      Breath sounds: Normal breath sounds. No wheezing.   Abdominal:      General: Bowel sounds are normal. There is no distension.      Palpations: Abdomen is soft.      Tenderness: There is no abdominal tenderness. There is no guarding or rebound.   Musculoskeletal:         General: No swelling or deformity.      Cervical back: Normal range of motion and neck supple.   Skin:     General: Skin is warm and dry.      Coloration: Skin is not jaundiced or pale.   Neurological:      Mental Status: She is alert and oriented to person, place, and time. Mental status is at baseline.   Psychiatric:         Mood and Affect: Mood normal.         Judgment: Judgment normal.         Home Medications:  Prior to Admission medications    Medication Sig Start Date End Date " Taking? Authorizing Provider   ondansetron (Zofran) 4 mg tablet TAKE 1 TABLET BY MOUTH EVERY FOUR HOURS AS NEEDED FOR NAUSEA AND VOMITING 11/21/24  Yes Historical Provider, MD   predniSONE (Deltasone) 10 mg tablet PLEASE SEE ATTACHED FOR DETAILED DIRECTIONS 11/18/24  Yes Historical Provider, MD   acetaminophen (Tylenol) 325 mg tablet Take 2 tablets (650 mg) by mouth every 4 hours if needed for moderate pain (4 - 6) or fever (temp greater than 38.0 C). 2/4/24   Isaiah Burt MD   amLODIPine (Norvasc) 5 mg tablet Take 1 tablet (5 mg) by mouth once daily.    Historical Provider, MD   atorvastatin (Lipitor) 40 mg tablet Take 1 tablet (40 mg) by mouth once daily.    Historical Provider, MD   budesonide EC (Entocort EC) 3 mg 24 hr capsule Take 3 capsules (9 mg) by mouth once daily in the morning. 1/16/25   Jet Palafox MD   ciprofloxacin (Cipro) 500 mg tablet Take 1 tablet (500 mg) by mouth every 12 hours. 2/4/24   Isaiah Burt MD   dicyclomine (Bentyl) 20 mg tablet Take by mouth 4 times a day before meals.    Historical Provider, MD   promethazine (Phenergan) 25 mg tablet Take 1 tablet (25 mg) by mouth every 6 hours if needed for nausea or vomiting.  Patient not taking: Reported on 1/16/2025 2/4/24   Isaiah Burt MD   upadacitinib ER (Rinvoq) 45 mg tablet extended release 24 hr Take 1 tablet (45 mg) by mouth once daily.    Historical Provider, MD   metroNIDAZOLE (Flagyl) 500 mg tablet Take 1 tablet (500 mg) by mouth every 8 hours. 2/4/24 1/16/25  Isaiah Burt MD         Relevant Results Recent labs reviewed in the EMR.    Lab Results   Component Value Date/Time    WBC 6.3 02/04/2024 0612    HGB 11.9 (L) 02/04/2024 0612    HGB 12.1 02/03/2024 0404    HGB 14.8 02/02/2024 0935     (H) 02/04/2024 0612    PLT 88 (L) 02/04/2024 0612    PLT 84 (L) 02/03/2024 0404    PLT 97 (L) 02/02/2024 0935       Lab Results   Component Value Date/Time     02/04/2024 0612    K 3.9 02/04/2024 0612      02/04/2024 0612    BUN 6 02/04/2024 0612    CREATININE 0.32 (L) 02/04/2024 0612    CREATININE 0.26 (L) 02/03/2024 0404       Lab Results   Component Value Date/Time    BILITOT 1.7 (H) 02/03/2024 0404    BILITOT 3.5 (H) 02/02/2024 0935    BILIDIR 0.7 (H) 02/02/2024 0935    ALKPHOS 63 02/03/2024 0404    ALKPHOS 74 02/02/2024 0935    AST 22 02/03/2024 0404    AST 34 02/02/2024 0935    ALT 21 02/03/2024 0404    ALT 29 02/02/2024 0935    LIPASE 69 02/02/2024 0935         Radiology: Imaging reviewed in the EMR.  No results found.    === 02/02/24 ===    CT ABDOMEN PELVIS W IV CONTRAST    - Impression -  Status post colectomy. The distal pouch and rectum demonstrates wall  thickening suspicious for inflammatory process at the pouch. There is  no bowel obstruction.    Small volume of free pelvic fluid and mild nonspecific strandy  density in the posterior pelvis on the right.    The remainder of the visualized bowel is unremarkable.    Mild fatty metamorphosis of the liver.    The remainder of the abdomen and pelvis is unremarkable.    Signed by: Eb Robbins 2/3/2024 4:23 PM  Dictation workstation:   [id number]

## 2025-01-17 ENCOUNTER — TELEPHONE (OUTPATIENT)
Facility: CLINIC | Age: 40
End: 2025-01-17
Payer: MEDICAID

## 2025-01-17 NOTE — TELEPHONE ENCOUNTER
----- Message from Jet Sorensonchurch sent at 1/16/2025  4:13 PM EST -----  Regarding: Bone density  Please contact patient to schedule a bone density exam.  I mentioned it but then forgot to order.  Thanks.

## 2025-02-04 ENCOUNTER — HOSPITAL ENCOUNTER (OUTPATIENT)
Dept: RADIOLOGY | Facility: CLINIC | Age: 40
End: 2025-02-04
Payer: MEDICAID

## 2025-02-05 ENCOUNTER — APPOINTMENT (OUTPATIENT)
Dept: SURGERY | Facility: CLINIC | Age: 40
End: 2025-02-05
Payer: MEDICAID

## 2025-02-28 ENCOUNTER — APPOINTMENT (OUTPATIENT)
Dept: RADIOLOGY | Facility: CLINIC | Age: 40
End: 2025-02-28
Payer: MEDICAID

## 2025-03-14 ENCOUNTER — APPOINTMENT (OUTPATIENT)
Dept: RADIOLOGY | Facility: CLINIC | Age: 40
End: 2025-03-14
Payer: MEDICAID

## 2025-04-22 ENCOUNTER — APPOINTMENT (OUTPATIENT)
Dept: RADIOLOGY | Facility: CLINIC | Age: 40
End: 2025-04-22
Payer: MEDICAID

## 2025-08-11 ENCOUNTER — APPOINTMENT (OUTPATIENT)
Dept: RADIOLOGY | Facility: HOSPITAL | Age: 40
End: 2025-08-11
Payer: MEDICAID

## 2025-08-11 ENCOUNTER — HOSPITAL ENCOUNTER (EMERGENCY)
Facility: HOSPITAL | Age: 40
Discharge: HOME | End: 2025-08-11
Attending: STUDENT IN AN ORGANIZED HEALTH CARE EDUCATION/TRAINING PROGRAM
Payer: MEDICAID

## 2025-08-11 VITALS
BODY MASS INDEX: 27.47 KG/M2 | HEIGHT: 67 IN | OXYGEN SATURATION: 98 % | DIASTOLIC BLOOD PRESSURE: 84 MMHG | SYSTOLIC BLOOD PRESSURE: 132 MMHG | RESPIRATION RATE: 18 BRPM | TEMPERATURE: 98.1 F | HEART RATE: 105 BPM | WEIGHT: 175 LBS

## 2025-08-11 DIAGNOSIS — S92.302A MULTIPLE CLOSED FRACTURES OF METATARSAL BONE OF LEFT FOOT, INITIAL ENCOUNTER: Primary | ICD-10-CM

## 2025-08-11 PROCEDURE — 73630 X-RAY EXAM OF FOOT: CPT | Mod: LT

## 2025-08-11 PROCEDURE — 73610 X-RAY EXAM OF ANKLE: CPT | Mod: LEFT SIDE | Performed by: RADIOLOGY

## 2025-08-11 PROCEDURE — 99284 EMERGENCY DEPT VISIT MOD MDM: CPT | Performed by: STUDENT IN AN ORGANIZED HEALTH CARE EDUCATION/TRAINING PROGRAM

## 2025-08-11 PROCEDURE — 73630 X-RAY EXAM OF FOOT: CPT | Mod: LEFT SIDE | Performed by: RADIOLOGY

## 2025-08-11 PROCEDURE — 73610 X-RAY EXAM OF ANKLE: CPT | Mod: LT

## 2025-08-11 PROCEDURE — 2500000001 HC RX 250 WO HCPCS SELF ADMINISTERED DRUGS (ALT 637 FOR MEDICARE OP): Performed by: STUDENT IN AN ORGANIZED HEALTH CARE EDUCATION/TRAINING PROGRAM

## 2025-08-11 RX ORDER — OXYCODONE HYDROCHLORIDE 5 MG/1
5 TABLET ORAL EVERY 6 HOURS PRN
Qty: 10 TABLET | Refills: 0 | Status: SHIPPED | OUTPATIENT
Start: 2025-08-11 | End: 2025-08-14

## 2025-08-11 RX ORDER — IBUPROFEN 600 MG/1
600 TABLET, FILM COATED ORAL EVERY 6 HOURS PRN
Qty: 28 TABLET | Refills: 0 | Status: SHIPPED | OUTPATIENT
Start: 2025-08-11 | End: 2025-08-18

## 2025-08-11 RX ORDER — OXYCODONE HYDROCHLORIDE 5 MG/1
5 TABLET ORAL ONCE
Refills: 0 | Status: COMPLETED | OUTPATIENT
Start: 2025-08-11 | End: 2025-08-11

## 2025-08-11 RX ADMIN — OXYCODONE HYDROCHLORIDE 5 MG: 5 TABLET ORAL at 09:15

## 2025-08-11 RX ADMIN — IBUPROFEN 600 MG: 200 TABLET, FILM COATED ORAL at 08:41

## 2025-08-11 ASSESSMENT — PAIN DESCRIPTION - ORIENTATION
ORIENTATION: LEFT
ORIENTATION: LEFT

## 2025-08-11 ASSESSMENT — PAIN - FUNCTIONAL ASSESSMENT
PAIN_FUNCTIONAL_ASSESSMENT: 0-10
PAIN_FUNCTIONAL_ASSESSMENT: 0-10

## 2025-08-11 ASSESSMENT — LIFESTYLE VARIABLES
HAVE YOU EVER FELT YOU SHOULD CUT DOWN ON YOUR DRINKING: NO
TOTAL SCORE: 0
HAVE PEOPLE ANNOYED YOU BY CRITICIZING YOUR DRINKING: NO
EVER FELT BAD OR GUILTY ABOUT YOUR DRINKING: NO
EVER HAD A DRINK FIRST THING IN THE MORNING TO STEADY YOUR NERVES TO GET RID OF A HANGOVER: NO

## 2025-08-11 ASSESSMENT — PAIN SCALES - GENERAL
PAINLEVEL_OUTOF10: 7
PAINLEVEL_OUTOF10: 8

## 2025-08-11 ASSESSMENT — PAIN DESCRIPTION - LOCATION
LOCATION: FOOT
LOCATION: FOOT

## 2025-08-11 ASSESSMENT — PAIN DESCRIPTION - DESCRIPTORS: DESCRIPTORS: CRAMPING

## 2025-08-26 DIAGNOSIS — M79.672 LEFT FOOT PAIN: ICD-10-CM

## 2025-09-02 ENCOUNTER — APPOINTMENT (OUTPATIENT)
Dept: ORTHOPEDIC SURGERY | Facility: HOSPITAL | Age: 40
End: 2025-09-02
Payer: MEDICAID